# Patient Record
Sex: MALE | Race: WHITE | Employment: OTHER | URBAN - METROPOLITAN AREA
[De-identification: names, ages, dates, MRNs, and addresses within clinical notes are randomized per-mention and may not be internally consistent; named-entity substitution may affect disease eponyms.]

---

## 2017-01-01 PROBLEM — Z86.79 HISTORY OF VENOUS DISEASE: Status: ACTIVE | Noted: 2017-01-01

## 2017-01-01 PROBLEM — Z72.0 TOBACCO ABUSE: Status: ACTIVE | Noted: 2017-01-01

## 2017-01-01 PROBLEM — Z01.818 PREOPERATIVE CLEARANCE: Status: ACTIVE | Noted: 2017-01-01

## 2017-01-01 PROBLEM — Z82.49 FAMILY HISTORY OF PREMATURE CAD: Status: ACTIVE | Noted: 2017-01-01

## 2017-01-01 PROBLEM — I77.9 LEFT-SIDED CAROTID ARTERY DISEASE (HCC): Status: ACTIVE | Noted: 2017-01-01

## 2017-01-01 PROBLEM — I10 ESSENTIAL HYPERTENSION: Status: ACTIVE | Noted: 2017-01-01

## 2017-01-01 PROBLEM — Z95.0 PRESENCE OF PERMANENT CARDIAC PACEMAKER: Status: ACTIVE | Noted: 2017-01-01

## 2017-01-01 PROBLEM — I49.5 SICK SINUS SYNDROME (HCC): Status: ACTIVE | Noted: 2017-01-01

## 2017-01-01 PROBLEM — E78.5 DYSLIPIDEMIA: Status: ACTIVE | Noted: 2017-01-01

## 2017-01-11 ENCOUNTER — CLINICAL SUPPORT (OUTPATIENT)
Dept: CARDIOLOGY CLINIC | Age: 72
End: 2017-01-11

## 2017-01-11 DIAGNOSIS — I49.5 SICK SINUS SYNDROME (HCC): Primary | ICD-10-CM

## 2017-01-11 DIAGNOSIS — Z95.0 PRESENCE OF PERMANENT CARDIAC PACEMAKER: ICD-10-CM

## 2017-01-23 ENCOUNTER — TELEPHONE (OUTPATIENT)
Dept: CARDIOLOGY CLINIC | Age: 72
End: 2017-01-23

## 2017-01-23 NOTE — TELEPHONE ENCOUNTER
Patient's spouse called the office today to update Dr. Damari Rosa on patient's state. Patient had surgery on 1/12/17 with Dr. Tez Apodaca for ear reconstruction and was placed on Keflex when discharged home. Since returning home patient has had hallucinations of someone sitting on his bed and has heard someone singing which was not heard by his wife. Patient spouse called Dr. Tez Apodaca office on 1/22/17 and was advised pt should stop Keflex, which he did. Pt is still hearing music after stopping Keflex, originally it was only around 2 am but is now heard off and on throughout the day. Pt's spouse is waiting for Dr. Everett Lozada office to open to call them to verify if it could still be a reaction to the Keflex.

## 2017-06-30 ENCOUNTER — OFFICE VISIT (OUTPATIENT)
Dept: CARDIOLOGY CLINIC | Age: 72
End: 2017-06-30

## 2017-06-30 VITALS
SYSTOLIC BLOOD PRESSURE: 91 MMHG | DIASTOLIC BLOOD PRESSURE: 59 MMHG | HEIGHT: 69 IN | OXYGEN SATURATION: 97 % | BODY MASS INDEX: 27.7 KG/M2 | HEART RATE: 50 BPM | WEIGHT: 187 LBS

## 2017-06-30 DIAGNOSIS — I48.91 ATRIAL FIBRILLATION, UNSPECIFIED TYPE (HCC): Primary | ICD-10-CM

## 2017-06-30 DIAGNOSIS — Z95.0 PRESENCE OF PERMANENT CARDIAC PACEMAKER: ICD-10-CM

## 2017-06-30 DIAGNOSIS — I10 ESSENTIAL HYPERTENSION: ICD-10-CM

## 2017-06-30 DIAGNOSIS — I48.0 PAROXYSMAL A-FIB (HCC): ICD-10-CM

## 2017-06-30 DIAGNOSIS — E78.5 DYSLIPIDEMIA: ICD-10-CM

## 2017-06-30 LAB
INR BLD: 2.7
PT POC: NORMAL SECONDS
VALID INTERNAL CONTROL?: YES

## 2017-06-30 RX ORDER — CELECOXIB 200 MG/1
CAPSULE ORAL 2 TIMES DAILY
COMMUNITY
End: 2018-09-12 | Stop reason: ALTCHOICE

## 2017-06-30 NOTE — MR AVS SNAPSHOT
Visit Information Date & Time Provider Department Dept. Phone Encounter #  
 6/30/2017  2:00 PM Lieutenant Pagan. Joby Bales Specialist at [facility] [phone] [id number] Follow-up Instructions Return in about 4 weeks (around 7/28/2017). Routing History Your Appointments 7/26/2017  9:45 AM  
PROCEDURE with Freddy Rubalcava Csi Cardio Specialist at [facility]) Appt Note: [address] 6 month check [facility] [address] [place] [address]  
  
    
 8/7/2017  9:15 AM  
Follow Up with Cristine Travis MD  
Cardio Specialist at [facility]) Appt Note: 4 wk f/u  
 [facility] [address] [place] [address] Upcoming Health Maintenance Date Due Hepatitis C Screening 1945 DTaP/Tdap/Td series (1 - Tdap) 3/22/1968 FOBT Q 1 YEAR AGE 50-75 3/22/1997 ZOSTER VACCINE AGE 60> 3/22/2007 GLAUCOMA SCREENING Q2Y 3/22/2012 Pneumococcal 65+ Low/Medium Risk (1 of 2 - PCV13) 3/22/2012 MEDICARE YEARLY EXAM 3/22/2012 INFLUENZA AGE 9 TO ADULT 8/1/2017 Allergies as of 6/30/2017  Review Complete On: 6/30/2017 By: Sabine Woods RN Severity Noted Reaction Type Reactions Latex, Natural Rubber  01/02/2013    Contact Dermatitis Advair Diskus [Fluticasone-salmeterol]  01/02/2013    Nausea and Vomiting Prednisone  01/02/2013    Hives Current Immunizations  Never Reviewed No immunizations on file. Not reviewed this visit You Were Diagnosed With   
  
 Codes Comments At[facility] fibrillation, unspecified type ([facility]    -  Primary ICD-10-CM: I48.91 
ICD-9-CM: 427.31 Vitals BP Pulse Height(growth percentile) Weight(growth percentile) SpO2 BMI 91/59 (!) 50 5' 9\" (1.753 m) 187 lb (84.8 kg) 97% 27.62 kg/m2 Smoking Status Former Smoker BMI and BSA Data Body Mass Index Body Surface Area  
 27.62 kg/m 2 2.03 m 2 Preferred Pharmacy Pharmacy Name Phone [facility] [phone] [place] [address], 130 [facility] [address] [place] [address] [phone] Your Updated Medication List  
  
   
This list is accurate as of: 6/30/17  2:54 PM.  Always use your most recent med list.  
  
  
  
  
 atorvastatin 20 mg tablet Commonly known as:  LIPITOR CeleBREX 200 mg capsule Generic drug:  celecoxib Take  by mouth two (2) times a day. cyanocobalamin 1,000 mcg/mL injection Commonly known as:  VITAMIN B12  
  
 dilTIAZem 360 mg SR capsule Commonly known as:  [facility] FISH OIL 1,000 mg Cap Generic drug:  omega-3 fatty acids-vitamin e Take 1 Cap by mouth. FLOVENT  mcg/actuation inhaler Generic drug:  fluticasone HORIZON NASAL CPAP SYSTEM  
by Does Not Apply route. hydrALAZINE 25 mg tablet Commonly known as:  APRESOLINE  
two (2) times a day. metoprolol succinate 100 mg tablet Commonly known as:  TOPROL-XL  
100 mg two (2) times a day. VENTOLIN HFA 90 mcg/actuation inhaler Generic drug:  albuterol Take  by inhalation. * warfarin 1 mg tablet Commonly known as:  COUMADIN  
  
 * warfarin 5 mg tablet Commonly known as:  COUMADIN  
  
 * Notice: This list has 2 medication(s) that are the same as other medications prescribed for you. Read the directions carefully, and ask your doctor or other care provider to review them with you. We Performed the Following AMB POC PT/INR [86851 CPT(R)] Follow-up Instructions Return in about 4 weeks (around 7/28/2017). Patient Instructions Stop Coumadin Start Eliquis once you get instructions after INR is done Monday Introducing [facility] [place] [facility]! José Luis Ninfajadyn introduces TranSwitch patient portal. Now you can access parts of your medical record, email your doctor's office, and request medication refills online. 1. In your internet browser, go to [email or web address]. [email or web address] 2. Click on the First Time User? Click Here link in the Sign In box. You will see the New Member Sign Up page. 3. Enter your TranSwitch Access Code exactly as it appears below. You will not need to use this code after youve completed the sign-up process. If you do not sign up before the expiration date, you must request a new code. · TranSwitch Access Code: [email or web address] Expires: 9/28/2017  1:27 PM 
 
4. Enter the last four digits of your Social Security Number (xxxx) and Date of Birth (mm/dd/yyyy) as indicated and click Submit. You will be taken to the next sign-up page. 5. Create a TranSwitch ID. This will be your TranSwitch login ID and cannot be changed, so think of one that is secure and easy to remember. 6. Create a TranSwitch password. You can change your password at any time. 7. Enter your Password Reset Question and Answer. This can be used at a later time if you forget your password. 8. Enter your e-mail address. You will receive e-mail notification when new information is available in [address] 9. Click Sign Up. You can now view and download portions of your medical record. 10. Click the Download Summary menu link to download a portable copy of your medical information. If you have questions, please visit the Frequently Asked Questions section of the TranSwitch website. Remember, TranSwitch is NOT to be used for urgent needs. For medical emergencies, dial 911. Now available from your iPhone and Android! Please provide this summary of care documentation to your next provider. Your primary care clinician is listed as Edwin Gonzales. If you have any questions after today's visit, please call [phone].

## 2017-06-30 NOTE — PROGRESS NOTES
1. Have you been to the ER, urgent care clinic since your last visit? Hospitalized since your last visit? No    2. Have you seen or consulted any other health care providers outside of the [facility] since your last visit? Include any pap smears or colon screening.  No

## 2017-06-30 NOTE — PROGRESS NOTES
Subjective:   Mr. Yonatan Bradley with a history of HTN, paroxysmal afib, sick sinus s/p PPM implantation,, and dyslipidemia is here today for office follow up. He is on Warfarin and interested in switching to a novel agent. He also is interested in establishing pacer checks remotely though this clinic. He denies any recent symptoms of chest pain, dyspnea, orthopnea, PND, LE edema, or palpitations. He is still very active doing yard work, but does occasionally have to stop and rest in the heat. Patient's cardiac risk factors are dyslipidemia, male gender, hypertension. Patient Active Problem List    Diagnosis Date Noted    Paroxysmal a-fib [facility]) 06/30/2017    Preoperative clearance 01/01/2017    Left-sided carotid artery disease ([facility] 01/01/2017    Sick sinus syndrome ([facility] 01/01/2017    Presence of permanent cardiac pacemaker 01/01/2017    History of venous disease 01/01/2017    Essential hypertension 01/01/2017    Dyslipidemia 01/01/2017    Tobacco abuse 01/01/2017    Family history of premature CAD 01/01/2017     Current Outpatient Prescriptions   Medication Sig Dispense Refill    celecoxib (CELEBREX) 200 mg capsule Take  by mouth two (2) times a day.  cyanocobalamin (VITAMIN B12) 1,000 mcg/mL injection       omega-3 fatty acids-vitamin e (FISH OIL) 1,000 mg cap Take 1 Cap by mouth.  albuterol (VENTOLIN HFA) 90 mcg/actuation inhaler Take  by inhalation.  OXYGEN-AIR DELIVERY SYSTEMS (HORIZON NASAL CPAP SYSTEM) by Does Not Apply route.  atorvastatin (LIPITOR) 20 mg tablet       diltiazem (TIAZAC) 360 mg SR capsule       FLOVENT  mcg/actuation inhaler       metoprolol succinate (TOPROL-XL) 100 mg tablet 100 mg two (2) times a day.  warfarin (COUMADIN) 1 mg tablet       warfarin (COUMADIN) 5 mg tablet       hydrALAZINE (APRESOLINE) 25 mg tablet two (2) times a day.        Allergies   Allergen Reactions    Latex, Natural Rubber Contact Dermatitis    Advair Diskus [Fluticasone-Salmeterol] Nausea and Vomiting    Prednisone Hives     No past medical history on file. Past Surgical History:   Procedure Laterality Date    HX PACEMAKER       No family history on file. History   Smoking Status    Former Smoker   Smokeless Tobacco    Not on file          Review of Systems, additional:  Constitutional: negative  Eyes: negative  Respiratory: negative for dyspnea on exertion  Cardiovascular: per HPI  Gastrointestinal: negative for nausea and vomiting  Musculoskeletal:negative  Neurological: negative  Behvioral/Psych: negative  Endocrine: negative  ENT: negative    Objective:     Visit Vitals    BP 91/59    Pulse (!) 50    Ht 5' 9\" (1.753 m)    Wt 187 lb (84.8 kg)    SpO2 97%    BMI 27.62 kg/m2     General:  alert, cooperative, no distress   Chest Wall: inspection normal - no chest wall deformities or tenderness, respiratory effort normal   Lung: clear to auscultation bilaterally   Heart:  normal rate, regular rhythm, normal S1, S2, no murmurs, rubs, clicks or gallops   Abdomen: soft, non-tender. Bowel sounds normal. No masses,  no organomegaly   Extremities: extremities normal, atraumatic, no cyanosis or edema Skin: no rashes   Neuro: alert, oriented, normal speech, no focal findings or movement disorder noted         Assessment/Plan:         ICD-10-CM ICD-9-CM    1. Atrial fibrillation, unspecified type (HCC) I48.91 427.31 AMB POC PT/INR      PROTHROMBIN TIME + INR   2. Presence of permanent cardiac pacemaker Z95.0 V45.01    3. Dyslipidemia E78.5 272.4    4. Essential hypertension I10 401.9      - Afib: He would like to switch from Warfarin to Eliquis. His INR in the office today is 2.7. He is to hold Coumadin through the weekend, return Monday for an INR check and start Eliquis if it is <2.0.   - PPM in place: he will return to clinic when [facility] rep is in office July 12th, to establish routine wireless checks.   - Dyslipidemia: continue statin  - HTN: continue Diltiazem and Hydralazine    - He understands to contact office as needed as symptoms dictate.

## 2017-07-03 ENCOUNTER — HOSPITAL ENCOUNTER (OUTPATIENT)
Dept: LAB | Age: 72
Discharge: HOME | End: 2017-07-03
Payer: MEDICARE

## 2017-07-03 DIAGNOSIS — I48.91 ATRIAL FIBRILLATION, UNSPECIFIED TYPE (HCC): ICD-10-CM

## 2017-07-03 LAB
INR PPP: 1.2 (ref 0.8–1.2)
PROTHROMBIN TIME: 14.8 SEC (ref 11.5–15.2)

## 2017-07-03 PROCEDURE — 36415 COLL VENOUS BLD VENIPUNCTURE: CPT | Performed by: PHYSICIAN ASSISTANT

## 2017-07-03 PROCEDURE — 85610 PROTHROMBIN TIME: CPT | Performed by: PHYSICIAN ASSISTANT

## 2017-07-03 NOTE — TELEPHONE ENCOUNTER
Per Mikhail Collins, pts INR was 1.2 today from hospital lab. Pt advised last dose of Coumadin was Thursday night 06/29/17. Okay to start Eliquis. Verbal order and read back per Kaylah Burgess.  Jennifer Shetty PA-C

## 2017-07-26 ENCOUNTER — CLINICAL SUPPORT (OUTPATIENT)
Dept: CARDIOLOGY CLINIC | Age: 72
End: 2017-07-26

## 2017-07-26 DIAGNOSIS — Z95.0 PACEMAKER: ICD-10-CM

## 2017-07-26 DIAGNOSIS — I48.0 PAROXYSMAL A-FIB (HCC): Primary | ICD-10-CM

## 2017-08-07 ENCOUNTER — OFFICE VISIT (OUTPATIENT)
Dept: CARDIOLOGY CLINIC | Age: 72
End: 2017-08-07

## 2017-08-07 VITALS
DIASTOLIC BLOOD PRESSURE: 83 MMHG | HEIGHT: 69 IN | HEART RATE: 76 BPM | WEIGHT: 190 LBS | BODY MASS INDEX: 28.14 KG/M2 | SYSTOLIC BLOOD PRESSURE: 143 MMHG | OXYGEN SATURATION: 96 %

## 2017-08-07 DIAGNOSIS — I48.0 PAROXYSMAL A-FIB (HCC): ICD-10-CM

## 2017-08-07 DIAGNOSIS — I49.5 SICK SINUS SYNDROME (HCC): ICD-10-CM

## 2017-08-07 DIAGNOSIS — Z95.0 PRESENCE OF PERMANENT CARDIAC PACEMAKER: ICD-10-CM

## 2017-08-07 DIAGNOSIS — Z82.49 FAMILY HISTORY OF PREMATURE CAD: ICD-10-CM

## 2017-08-07 DIAGNOSIS — Z72.0 TOBACCO ABUSE: ICD-10-CM

## 2017-08-07 DIAGNOSIS — I77.9 LEFT-SIDED CAROTID ARTERY DISEASE (HCC): ICD-10-CM

## 2017-08-07 DIAGNOSIS — Z86.79 HISTORY OF VENOUS DISEASE: ICD-10-CM

## 2017-08-07 DIAGNOSIS — E78.5 DYSLIPIDEMIA: ICD-10-CM

## 2017-08-07 DIAGNOSIS — I10 ESSENTIAL HYPERTENSION: Primary | ICD-10-CM

## 2017-08-07 NOTE — MR AVS SNAPSHOT
Visit Information Date & Time Provider Department Dept. Phone Encounter #  
 8/7/2017  9:15 AM Marcelino Looney  [facility] [phone] [id number] Follow-up Instructions Return in about 6 months (around 2/7/2018). Upcoming Health Maintenance Date Due Hepatitis C Screening 1945 DTaP/Tdap/Td series (1 - Tdap) 3/22/1968 FOBT Q 1 YEAR AGE 50-75 3/22/1997 ZOSTER VACCINE AGE 60> 1/22/2007 GLAUCOMA SCREENING Q2Y 3/22/2012 Pneumococcal 65+ Low/Medium Risk (1 of 2 - PCV13) 3/22/2012 MEDICARE YEARLY EXAM 3/22/2012 INFLUENZA AGE 9 TO ADULT 8/1/2017 Allergies as of 8/7/2017  Review Complete On: 6/30/2017 By: Tray Chang. Ranjeet Honeycutt PA-C Severity Noted Reaction Type Reactions Latex, Natural Rubber  01/02/2013    Contact Dermatitis Advair Diskus [Fluticasone-salmeterol]  01/02/2013    Nausea and Vomiting Prednisone  01/02/2013    Hives Current Immunizations  Never Reviewed No immunizations on file. Not reviewed this visit Vitals BP Pulse Height(growth percentile) Weight(growth percentile) SpO2 BMI  
 143/83 76 5' 9\" (1.753 m) 190 lb (86.2 kg) 96% 28.06 kg/m2 Smoking Status Former Smoker BMI and BSA Data Body Mass Index Body Surface Area 28.06 kg/m 2 2.05 m 2 Preferred Pharmacy Pharmacy Name Phone [facility] [phone], [phone] [place] [address] [phone] Your Updated Medication List  
  
   
This list is accurate as of: 8/7/17  9:30 AM.  Always use your most recent med list.  
  
  
  
  
 apixaban 5 mg tablet Commonly known as:  Cherylin Guardian Take 1 Tab by mouth two (2) times a day. atorvastatin 20 mg tablet Commonly known as:  LIPITOR CeleBREX 200 mg capsule Generic drug:  celecoxib Take  by mouth two (2) times a day. cyanocobalamin 1,000 mcg/mL injection Commonly known as:  VITAMIN B12  
  
 dilTIAZem 360 mg SR capsule Commonly known as:  [facility] FISH OIL 1,000 mg Cap Generic drug:  omega-3 fatty acids-vitamin e Take 1 Cap by mouth. FLOVENT  mcg/actuation inhaler Generic drug:  fluticasone HORIZON NASAL CPAP SYSTEM  
by Does Not Apply route. hydrALAZINE 25 mg tablet Commonly known as:  APRESOLINE  
two (2) times a day. metoprolol succinate 100 mg tablet Commonly known as:  TOPROL-XL  
100 mg two (2) times a day. VENTOLIN HFA 90 mcg/actuation inhaler Generic drug:  albuterol Take  by inhalation. * warfarin 1 mg tablet Commonly known as:  COUMADIN  
  
 * warfarin 5 mg tablet Commonly known as:  COUMADIN  
  
 * Notice: This list has 2 medication(s) that are the same as other medications prescribed for you. Read the directions carefully, and ask your doctor or other care provider to review them with you. Follow-up Instructions Return in about 6 months (around 2/7/2018). Introducing [facility]! Alessia Maddox introduces Flavourly patient portal. Now you can access parts of your medical record, email your doctor's office, and request medication refills online. 1. In your internet browser, go to [email or web address] 2. Click on the First Time User? Click Here link in the Sign In box. You will see the New Member Sign Up page. 3. Enter your Flavourly Access Code exactly as it appears below. You will not need to use this code after youve completed the sign-up process. If you do not sign up before the expiration date, you must request a new code. · Flavourly Access Code: [email or web address] Expires: 9/28/2017  1:27 PM 
 
4. Enter the last four digits of your Social Security Number (xxxx) and Date of Birth (mm/dd/yyyy) as indicated and click Submit. You will be taken to the next sign-up page. 5. Create a iTaggit ID. This will be your iTaggit login ID and cannot be changed, so think of one that is secure and easy to remember. 6. Create a iTaggit password. You can change your password at any time. 7. Enter your Password Reset Question and Answer. This can be used at a later time if you forget your password. 8. Enter your e-mail address. You will receive e-mail notification when new information is available in [address] 9. Click Sign Up. You can now view and download portions of your medical record. 10. Click the Download Summary menu link to download a portable copy of your medical information. If you have questions, please visit the Frequently Asked Questions section of the iTaggit website. Remember, iTaggit is NOT to be used for urgent needs. For medical emergencies, dial 911. Now available from your iPhone and Android! Please provide this summary of care documentation to your next provider. Your primary care clinician is listed as Ritesh Carey. If you have any questions after today's visit, please call [phone].

## 2017-08-07 NOTE — LETTER
Patient:  Emelia Byers YOB: 1945 Date of Visit: 8/7/2017 Dear Emiliana Dave MD 
[address] [facility] [address] [address] VIA Facsimile: [phone] 
 : 
 
 
Thank you for referring Mr. Dilia Morris to me for evaluation/treatment. Below are the relevant portions of my assessment and plan of care. Subjective:  
   Dilia Morris is in the office today for cardiac reevaluation. He has a history of hypertension, chronic atrial fibrillation, sick sinus syndrome with prior permanent pacemaker implantation and dyslipidemia. On his last visit of 06/30, he requested to be switched off of Coumadin to one of the novel anticoagulants. He is on Eliquis and tolerating it. He has had no recent symptoms of chest pain, dyspnea, orthopnea, PND, lower extremity edema or palpitations. He continues to be physically active and walks his dog about six blocks every day. He has had no chest pain or limiting shortness of breath. He is now interested in having his pacemaker followed here, as [facility] has [facility] in the past.       
 
 
  
 
 
 
Patient Active Problem List  
 Diagnosis Date Noted  Paroxysmal a-fib ([facility] 06/30/2017  Preoperative clearance 01/01/2017  Left-sided carotid artery disease ([facility] 01/01/2017  Sick sinus syndrome ([facility] 01/01/2017  Presence of permanent cardiac pacemaker 01/01/2017  History of venous disease 01/01/2017  Essential hypertension 01/01/2017  Dyslipidemia 01/01/2017  Tobacco abuse 01/01/2017  Family history of premature CAD 01/01/2017 Current Outpatient Prescriptions Medication Sig Dispense Refill  apixaban (ELIQUIS) 5 mg tablet Take 1 Tab by mouth two (2) times a day. 180 Tab 3  celecoxib (CELEBREX) 200 mg capsule Take  by mouth two (2) times a day.  hydrALAZINE (APRESOLINE) 25 mg tablet two (2) times a day.  cyanocobalamin (VITAMIN B12) 1,000 mcg/mL injection  omega-3 fatty acids-vitamin e (FISH OIL) 1,000 mg cap Take 1 Cap by mouth.  albuterol (VENTOLIN HFA) 90 mcg/actuation inhaler Take  by inhalation.  OXYGEN-AIR DELIVERY SYSTEMS (HORIZON NASAL CPAP SYSTEM) by Does Not Apply route.  atorvastatin (LIPITOR) 20 mg tablet  diltiazem (TIAZAC) 360 mg SR capsule  FLOVENT  mcg/actuation inhaler  metoprolol succinate (TOPROL-XL) 100 mg tablet 100 mg two (2) times a day.  warfarin (COUMADIN) 1 mg tablet  warfarin (COUMADIN) 5 mg tablet Allergies Allergen Reactions  Latex, Natural Rubber Contact Dermatitis  Advair Diskus [Fluticasone-Salmeterol] Nausea and Vomiting  Prednisone Hives No past medical history on file. Past Surgical History:  
Procedure Laterality Date  HX PACEMAKER No family history on file. History Smoking Status  Former Smoker Smokeless Tobacco  
 Not on file Review of Systems, additional: 
Constitutional: negative Eyes: negative Respiratory: negative Cardiovascular: negative Gastrointestinal: negative Musculoskeletal:negative Neurological: negative Behvioral/Psych: negative Endocrine: negative ENT: negative Objective:  
 
Visit Vitals  /83  Pulse 76  Ht 5' 9\" (1.753 m)  Wt 190 lb (86.2 kg)  SpO2 96%  BMI 28.06 kg/m2 General:  alert, cooperative, no distress Chest Wall: inspection normal - no chest wall deformities or tenderness, respiratory effort normal  
Lung: clear to auscultation bilaterally Heart:  normal rate and regular rhythm, no gallops noted Abdomen: soft, non-tender. Bowel sounds normal. No masses,  no organomegaly Extremities: extremities normal, atraumatic, no cyanosis or edema Skin: no rashes Neuro: alert, oriented, normal speech, no focal findings or movement disorder noted Assessment/Plan: ICD-10-CM ICD-9-CM 1. Essential hypertension, controlled in office today I10 401.9 2. Left-sided carotid artery disease (HCC) I77.9 447.9 3. Chronic a-fib ([facility] on Eliquis for anticoagulation, diltiazem and metoprolol for rate control. RT 6 mos with pacer interrogation I48.0 427.31   
4. Sick sinus syndrome ([facility] S/P PPM I49.5 427.81   
5. Dyslipidemia E78.5 272.4 6. Family history of premature CAD Z82.49 V17.3 7. History of venous disease, S/P vein stripping Z86.79 V12.59   
8. Presence of permanent cardiac pacemaker, initial implant 2007, generator replacement 2014 all done at [facility].  Z95.0 V45.01   
9. Tobacco abuse Z72.0 305.1 If you have questions, please do not hesitate to call me. I look forward to following Mr. Perez Castañeda along with you. Sincerely, Tao Garrido MD

## 2017-08-13 NOTE — PROGRESS NOTES
Subjective:      Hayde Gandara is in the office today for cardiac reevaluation. He has a history of hypertension, chronic atrial fibrillation, sick sinus syndrome with prior permanent pacemaker implantation and dyslipidemia. On his last visit of 06/30, he requested to be switched off of Coumadin to one of the novel anticoagulants. He is on Eliquis and tolerating it. He has had no recent symptoms of chest pain, dyspnea, orthopnea, PND, lower extremity edema or palpitations. He continues to be physically active and walks his dog about six blocks every day. He has had no chest pain or limiting shortness of breath. He is now interested in having his pacemaker followed here, as he has been followed at [facility] in the past.                     Patient Active Problem List    Diagnosis Date Noted    Paroxysmal a-fib [facility]) 06/30/2017    Preoperative clearance 01/01/2017    Left-sided carotid artery disease ([facility] 01/01/2017    Sick sinus syndrome ([facility] 01/01/2017    Presence of permanent cardiac pacemaker 01/01/2017    History of venous disease 01/01/2017    Essential hypertension 01/01/2017    Dyslipidemia 01/01/2017    Tobacco abuse 01/01/2017    Family history of premature CAD 01/01/2017     Current Outpatient Prescriptions   Medication Sig Dispense Refill    apixaban (ELIQUIS) 5 mg tablet Take 1 Tab by mouth two (2) times a day. 180 Tab 3    celecoxib (CELEBREX) 200 mg capsule Take  by mouth two (2) times a day.  hydrALAZINE (APRESOLINE) 25 mg tablet two (2) times a day.  cyanocobalamin (VITAMIN B12) 1,000 mcg/mL injection       omega-3 fatty acids-vitamin e (FISH OIL) 1,000 mg cap Take 1 Cap by mouth.  albuterol (VENTOLIN HFA) 90 mcg/actuation inhaler Take  by inhalation.  OXYGEN-AIR DELIVERY SYSTEMS (HORIZON NASAL CPAP SYSTEM) by Does Not Apply route.       atorvastatin (LIPITOR) 20 mg tablet       diltiazem (TIAZAC) 360 mg SR capsule       FLOVENT  mcg/actuation inhaler       metoprolol succinate (TOPROL-XL) 100 mg tablet 100 mg two (2) times a day.  warfarin (COUMADIN) 1 mg tablet       warfarin (COUMADIN) 5 mg tablet        Allergies   Allergen Reactions    Latex, Natural Rubber Contact Dermatitis    Advair Diskus [Fluticasone-Salmeterol] Nausea and Vomiting    Prednisone Hives     No past medical history on file. Past Surgical History:   Procedure Laterality Date    HX PACEMAKER       No family history on file. History   Smoking Status    Former Smoker   Smokeless Tobacco    Not on file          Review of Systems, additional:  Constitutional: negative  Eyes: negative  Respiratory: negative  Cardiovascular: negative  Gastrointestinal: negative  Musculoskeletal:negative  Neurological: negative  Behvioral/Psych: negative  Endocrine: negative  ENT: negative    Objective:     Visit Vitals    /83    Pulse 76    Ht 5' 9\" (1.753 m)    Wt 190 lb (86.2 kg)    SpO2 96%    BMI 28.06 kg/m2     General:  alert, cooperative, no distress   Chest Wall: inspection normal - no chest wall deformities or tenderness, respiratory effort normal   Lung: clear to auscultation bilaterally   Heart:  normal rate and regular rhythm, no gallops noted   Abdomen: soft, non-tender. Bowel sounds normal. No masses,  no organomegaly   Extremities: extremities normal, atraumatic, no cyanosis or edema Skin: no rashes   Neuro: alert, oriented, normal speech, no focal findings or movement disorder noted         Assessment/Plan:       ICD-10-CM ICD-9-CM    1. Essential hypertension, controlled in office today I10 401.9    2. Left-sided carotid artery disease (HCC) I77.9 447.9    3. Chronic a-fib ([facility] [address] [facility] on Eliquis for anticoagulation, diltiazem and metoprolol for rate control. RT 6 mos with pacer interrogation I48.0 427.31    4. Sick sinus syndrome ([facility] S/P PPM I49.5 427.81    5. Dyslipidemia E78.5 272.4    6.  Family history of premature CAD Z82.49 V17.3 7. History of venous disease, S/P vein stripping Z86.79 V12.59    8. Presence of permanent cardiac pacemaker, initial implant 2007, generator replacement 2014 all done at [facility].  Z95.0 V45.01    9.  Tobacco abuse Z72.0 305.1

## 2017-09-27 ENCOUNTER — TELEPHONE (OUTPATIENT)
Dept: CARDIOLOGY CLINIC | Age: 72
End: 2017-09-27

## 2017-09-27 NOTE — TELEPHONE ENCOUNTER
Pt's wife called office to advise that pt is having a toenail removed on 10/9/17 and wants to know if he can hold Eliquis for 1 day prior to procedure. Per Dr. Jovani Olivas, pt can hold Eliquis 1 day prior to procedure and continue as prescribed after procedure is completed. Pt's wife made aware of message.      Verbal order and read back per Evon Bamberger, MD

## 2018-01-02 NOTE — COMMUNICATION BODY
Subjective:      Nichole Crocker is in the office today for cardiac reevaluation. He has a history of hypertension, chronic atrial fibrillation, sick sinus syndrome with prior permanent pacemaker implantation and dyslipidemia. On his last visit of 06/30, he requested to be switched off of Coumadin to one of the novel anticoagulants. He is on Eliquis and tolerating it. He has had no recent symptoms of chest pain, dyspnea, orthopnea, PND, lower extremity edema or palpitations. He continues to be physically active and walks his dog about six blocks every day. He has had no chest pain or limiting shortness of breath. He is now interested in having his pacemaker followed here, as he has been [facility] in the past.                     Patient Active Problem List    Diagnosis Date Noted    Paroxysmal a-fib [facility]) 06/30/2017    Preoperative clearance 01/01/2017    Left-sided carotid artery disease ([facility] [address] 01/01/2017    Sick sinus syndrome ([facility] [address] 01/01/2017    Presence of permanent cardiac pacemaker 01/01/2017    History of venous disease 01/01/2017    Essential hypertension 01/01/2017    Dyslipidemia 01/01/2017    Tobacco abuse 01/01/2017    Family history of premature CAD 01/01/2017     Current Outpatient Prescriptions   Medication Sig Dispense Refill    apixaban (ELIQUIS) 5 mg tablet Take 1 Tab by mouth two (2) times a day. 180 Tab 3    celecoxib (CELEBREX) 200 mg capsule Take  by mouth two (2) times a day.  hydrALAZINE (APRESOLINE) 25 mg tablet two (2) times a day.  cyanocobalamin (VITAMIN B12) 1,000 mcg/mL injection       omega-3 fatty acids-vitamin e (FISH OIL) 1,000 mg cap Take 1 Cap by mouth.  albuterol (VENTOLIN HFA) 90 mcg/actuation inhaler Take  by inhalation.  OXYGEN-AIR DELIVERY SYSTEMS (HORIZON NASAL CPAP SYSTEM) by Does Not Apply route.       atorvastatin (LIPITOR) 20 mg tablet       diltiazem (TIAZAC) 360 mg SR capsule       FLOVENT  mcg/actuation inhaler       metoprolol succinate (TOPROL-XL) 100 mg tablet 100 mg two (2) times a day.  warfarin (COUMADIN) 1 mg tablet       warfarin (COUMADIN) 5 mg tablet        Allergies   Allergen Reactions    Latex, Natural Rubber Contact Dermatitis    Advair Diskus [Fluticasone-Salmeterol] Nausea and Vomiting    Prednisone Hives     No past medical history on file. Past Surgical History:   Procedure Laterality Date    HX PACEMAKER       No family history on file. History   Smoking Status    Former Smoker   Smokeless Tobacco    Not on file          Review of Systems, additional:  Constitutional: negative  Eyes: negative  Respiratory: negative  Cardiovascular: negative  Gastrointestinal: negative  Musculoskeletal:negative  Neurological: negative  Behvioral/Psych: negative  Endocrine: negative  ENT: negative    Objective:     Visit Vitals    /83    Pulse 76    Ht 5' 9\" (1.753 m)    Wt 190 lb (86.2 kg)    SpO2 96%    BMI 28.06 kg/m2     General:  alert, cooperative, no distress   Chest Wall: inspection normal - no chest wall deformities or tenderness, respiratory effort normal   Lung: clear to auscultation bilaterally   Heart:  normal rate and regular rhythm, no gallops noted   Abdomen: soft, non-tender. Bowel sounds normal. No masses,  no organomegaly   Extremities: extremities normal, atraumatic, no cyanosis or edema Skin: no rashes   Neuro: alert, oriented, normal speech, no focal findings or movement disorder noted         Assessment/Plan:       ICD-10-CM ICD-9-CM    1. Essential hypertension, controlled in office today I10 401.9    2. Left-sided carotid artery disease (HCC) I77.9 447.9    3. Chronic a-fib ([facility] [address] [address] on Eliquis for anticoagulation, diltiazem and metoprolol for rate control. RT 6 mos with pacer interrogation I48.0 427.31    4. Sick sinus syndrome ([facility] [address] [address] S/P PPM I49.5 427.81    5. Dyslipidemia E78.5 272.4    6.  Family history of premature CAD Z82.49 V17.3 7. History of venous disease, S/P vein stripping Z86.79 V12.59    8. Presence of permanent cardiac pacemaker, initial implant 2007, generator replacement 2014 all done at [facility].  Z95.0 V45.01    9.  Tobacco abuse Z72.0 305.1

## 2018-01-24 ENCOUNTER — CLINICAL SUPPORT (OUTPATIENT)
Dept: CARDIOLOGY CLINIC | Age: 73
End: 2018-01-24

## 2018-01-24 ENCOUNTER — OFFICE VISIT (OUTPATIENT)
Dept: CARDIOLOGY CLINIC | Age: 73
End: 2018-01-24

## 2018-01-24 VITALS
DIASTOLIC BLOOD PRESSURE: 78 MMHG | HEART RATE: 84 BPM | WEIGHT: 191 LBS | SYSTOLIC BLOOD PRESSURE: 135 MMHG | HEIGHT: 69 IN | OXYGEN SATURATION: 98 % | BODY MASS INDEX: 28.29 KG/M2

## 2018-01-24 DIAGNOSIS — I48.0 PAROXYSMAL A-FIB (HCC): ICD-10-CM

## 2018-01-24 DIAGNOSIS — E78.5 DYSLIPIDEMIA: ICD-10-CM

## 2018-01-24 DIAGNOSIS — I77.9 LEFT-SIDED CAROTID ARTERY DISEASE (HCC): ICD-10-CM

## 2018-01-24 DIAGNOSIS — Z72.0 TOBACCO ABUSE: ICD-10-CM

## 2018-01-24 DIAGNOSIS — I49.5 SICK SINUS SYNDROME (HCC): Primary | ICD-10-CM

## 2018-01-24 DIAGNOSIS — I10 ESSENTIAL HYPERTENSION: ICD-10-CM

## 2018-01-24 DIAGNOSIS — Z82.49 FAMILY HISTORY OF PREMATURE CAD: ICD-10-CM

## 2018-01-24 DIAGNOSIS — Z95.0 PRESENCE OF PERMANENT CARDIAC PACEMAKER: ICD-10-CM

## 2018-01-24 NOTE — PROGRESS NOTES
1. Have you been to the ER, urgent care clinic since your last visit? Hospitalized since your last visit? No    2. Have you seen or consulted any other health care providers outside of the [address] [place] [address] since your last visit? Include any pap smears or colon screening.  No

## 2018-01-24 NOTE — LETTER
Patient:  Lorena Prasad YOB: 1945 Date of Visit: 1/24/2018 Dear Kareem Edwards MD 
[address] [facility] [address] [address] VIA Facsimile: [phone] 
 : 
 
 
Thank you for referring Mr. Westley Alonso to me for evaluation/treatment. Below are the relevant portions of my assessment and plan of care. Subjective:  
   Westley Alonso is in the office today for cardiac reevaluation. He has a history of hypertension, chronic atrial fibrillation, sick sinus syndrome with prior permanent pacemaker implantation and dyslipidemia. In regard to his atrial fibrillation, he is on Eliquis for stroke prevention and a combination of a BB and a CCB. He has had no recent symptoms of chest pain, dyspnea, orthopnea, PND, lower extremity edema or palpitations. He continues to be physically active and walks his dog about six blocks every day. He has had no chest pain or limiting shortness of breath. He had his pacemaker checked in the office today. Patient Active Problem List  
 Diagnosis Date Noted  Paroxysmal a-fib ([facility] [address] 06/30/2017  Preoperative clearance 01/01/2017  Left-sided carotid artery disease ([facility] [address] 01/01/2017  Sick sinus syndrome ([facility] [address] 01/01/2017  Presence of permanent cardiac pacemaker 01/01/2017  History of venous disease 01/01/2017  Essential hypertension 01/01/2017  Dyslipidemia 01/01/2017  Tobacco abuse 01/01/2017  Family history of premature CAD 01/01/2017 Current Outpatient Prescriptions Medication Sig Dispense Refill  apixaban (ELIQUIS) 5 mg tablet Take 1 Tab by mouth two (2) times a day. 180 Tab 3  celecoxib (CELEBREX) 200 mg capsule Take  by mouth two (2) times a day.  hydrALAZINE (APRESOLINE) 25 mg tablet two (2) times a day.  cyanocobalamin (VITAMIN B12) 1,000 mcg/mL injection  omega-3 fatty acids-vitamin e (FISH OIL) 1,000 mg cap Take 1 Cap by mouth.  albuterol (VENTOLIN HFA) 90 mcg/actuation inhaler Take  by inhalation.  OXYGEN-AIR DELIVERY SYSTEMS (HORIZON NASAL CPAP SYSTEM) by Does Not Apply route.  atorvastatin (LIPITOR) 20 mg tablet  diltiazem (TIAZAC) 360 mg SR capsule  FLOVENT  mcg/actuation inhaler  metoprolol succinate (TOPROL-XL) 100 mg tablet 100 mg two (2) times a day.  warfarin (COUMADIN) 1 mg tablet  warfarin (COUMADIN) 5 mg tablet Allergies Allergen Reactions  Latex, Natural Rubber Contact Dermatitis  Advair Diskus [Fluticasone-Salmeterol] Nausea and Vomiting  Prednisone Hives No past medical history on file. Past Surgical History:  
Procedure Laterality Date  HX PACEMAKER No family history on file. History Smoking Status  Former Smoker Smokeless Tobacco  
 Never Used Review of Systems, additional: 
Constitutional: negative Eyes: negative Respiratory: negative Cardiovascular: negative Gastrointestinal: negative Musculoskeletal:negative Neurological: negative Behvioral/Psych: negative Endocrine: negative ENT: negative Objective:  
 
Visit Vitals  /78  Pulse 84  
 Ht 5' 9\" (1.753 m)  Wt 191 lb (86.6 kg) Comment: pt reported  SpO2 98%  BMI 28.21 kg/m2 General:  alert, cooperative, no distress Chest Wall: inspection normal - no chest wall deformities or tenderness, respiratory effort normal  
Lung: clear to auscultation bilaterally Heart:  normal rate and regular rhythm, no gallops noted Abdomen: soft, non-tender. Bowel sounds normal. No masses,  no organomegaly Extremities: extremities normal, atraumatic, no cyanosis or edema Skin: no rashes Neuro: alert, oriented, normal speech, no focal findings or movement disorder noted Assessment/Plan: ICD-10-CM ICD-9-CM 1. Essential hypertension, controlled in office today I10 401.9 2. Left-sided carotid artery disease ([facility] [address] followed by Dr Nora Huff I77.9 447.9 3. Chronic a-fib ([facility] [address] on Eliquis for anticoagulation, diltiazem and metoprolol for rate control. RT 6 mos  I48.0 427.31   
4. Sick sinus syndrome ([facility] [address] S/P PPM. Pacer checked today in office and functioning well. I49.5 427.81   
5. Dyslipidemia E78.5 272.4 6. Family history of premature CAD Z82.49 V17.3 7. History of venous disease, S/P vein stripping Z86.79 V12.59   
8. Presence of permanent cardiac pacemaker, initial implant 2007, generator replacement 2014 all done at [facility].  Z95.0 V45.01   
9. Tobacco abuse Z72.0 305.1 If you have questions, please do not hesitate to call me. I look forward to following Mr. Mahogany Adams along with you. Sincerely, Justin Alicea MD

## 2018-01-24 NOTE — MR AVS SNAPSHOT
[address] [facility] 
 
 
 [address] [facility] [address] [address] 
[phone] Patient: Dimas Molina MRN: [id number] Visit Information Date & Time Provider Department Dept. Phone Encounter #  
 1/24/2018  9:00 AM Jennifer Flores MD [address] [place] [address] Specialist at [place] [phone] Follow-up Instructions Return in about 6 months (around 7/24/2018). Your Appointments 7/25/2018 10:00 AM  
Follow Up with Jennifer Flores MD  
Cardio Specialist at [address]) Appt Note: 6 months - also need BS check [id number] [facility] [address] [address] [address]  
  
   
 [id number] [place] [address] Upcoming Health Maintenance Date Due Hepatitis C Screening 1945 DTaP/Tdap/Td series (1 - Tdap) 3/22/1968 FOBT Q 1 YEAR AGE 50-75 3/22/1997 ZOSTER VACCINE AGE 60> 1/22/2007 GLAUCOMA SCREENING Q2Y 3/22/2012 Pneumococcal 65+ Low/Medium Risk (1 of 2 - PCV13) 3/22/2012 MEDICARE YEARLY EXAM 3/22/2012 Influenza Age 5 to Adult 8/1/2017 Allergies as of 1/24/2018  Review Complete On: 1/24/2018 By: Jg Mejia RN Severity Noted Reaction Type Reactions Latex, Natural Rubber  01/02/2013    Contact Dermatitis Advair Diskus [Fluticasone-salmeterol]  01/02/2013    Nausea and Vomiting Prednisone  01/02/2013    Hives Current Immunizations  Never Reviewed No immunizations on file. Not reviewed this visit Vitals BP Pulse Height(growth percentile) Weight(growth percentile) SpO2 BMI  
 135/78 84 5' 9\" (1.753 m) 191 lb (86.6 kg) 98% 28.21 kg/m2 Smoking Status Former Smoker Vitals History BMI and BSA Data Body Mass Index Body Surface Area  
 28.21 kg/m 2 2.05 m 2 Preferred Pharmacy Pharmacy Name Phone [phone] [facility] [address] [facility] [phone] [place] [address] [phone] Your Updated Medication List  
  
   
This list is accurate as of: 1/24/18  9:55 AM.  Always use your most recent med list.  
  
  
  
  
 apixaban 5 mg tablet Commonly known as:  Markus Bury Take 1 Tab by mouth two (2) times a day. atorvastatin 20 mg tablet Commonly known as:  LIPITOR CeleBREX 200 mg capsule Generic drug:  celecoxib Take  by mouth two (2) times a day. cyanocobalamin 1,000 mcg/mL injection Commonly known as:  VITAMIN B12  
  
 dilTIAZem 360 mg SR capsule Commonly known as:  [facility] FISH OIL 1,000 mg Cap Generic drug:  omega-3 fatty acids-vitamin e Take 1 Cap by mouth. FLOVENT  mcg/actuation inhaler Generic drug:  fluticasone HORIZON NASAL CPAP SYSTEM  
by Does Not Apply route. hydrALAZINE 25 mg tablet Commonly known as:  APRESOLINE  
two (2) times a day. metoprolol succinate 100 mg tablet Commonly known as:  TOPROL-XL  
100 mg two (2) times a day. VENTOLIN HFA 90 mcg/actuation inhaler Generic drug:  albuterol Take  by inhalation. * warfarin 1 mg tablet Commonly known as:  COUMADIN  
  
 * warfarin 5 mg tablet Commonly known as:  COUMADIN  
  
 * Notice: This list has 2 medication(s) that are the same as other medications prescribed for you. Read the directions carefully, and ask your doctor or other care provider to review them with you. Follow-up Instructions Return in about 6 months (around 7/24/2018). Introducing [facility]! [facility] introduces [employer] patient portal. Now you can access parts of your medical record, email your doctor's office, and request medication refills online. 1. In your internet browser, go to [email or web address]://[employer]. [employer] 2. Click on the First Time User? Click Here link in the Sign In box. You will see the New Member Sign Up page. 3. Enter your [employer] Access Code exactly as it appears below. You will not need to use this code after youve completed the sign-up process. If you do not sign up before the expiration date, you must request a new code. · [employer] Access Code: [email or web address] Expires: 4/24/2018  8:27 AM 
 
4. Enter the last four digits of your Social Security Number (xxxx) and Date of Birth (mm/dd/yyyy) as indicated and click Submit. You will be taken to the next sign-up page. 5. Create a [employer] ID. This will be your [employer] login ID and cannot be changed, so think of one that is secure and easy to remember. 6. Create a [employer] password. You can change your password at any time. 7. Enter your Password Reset Question and Answer. This can be used at a later time if you forget your password. 8. Enter your e-mail address. You will receive e-mail notification when new information is available in [address] 9. Click Sign Up. You can now view and download portions of your medical record. 10. Click the Download Summary menu link to download a portable copy of your medical information. If you have questions, please visit the Frequently Asked Questions section of the [employer] website. Remember, [employer] is NOT to be used for urgent needs. For medical emergencies, dial 911. Now available from your iPhone and Android! Please provide this summary of care documentation to your next provider. Your primary care clinician is listed as Fercho Estes. If you have any questions after today's visit, please call [phone].

## 2018-01-28 NOTE — PROGRESS NOTES
Subjective:      Lisa Bradley is in the office today for cardiac reevaluation. He has a history of hypertension, chronic atrial fibrillation, sick sinus syndrome with prior permanent pacemaker implantation and dyslipidemia. In regard to his atrial fibrillation, he is on Eliquis for stroke prevention and a combination of a BB and a CCB. He has had no recent symptoms of chest pain, dyspnea, orthopnea, PND, lower extremity edema or palpitations. He continues to be physically active and walks his dog about six blocks every day. He has had no chest pain or limiting shortness of breath. He had his pacemaker checked in the office today. Patient Active Problem List    Diagnosis Date Noted    Paroxysmal a-fib [facility]) 06/30/2017    Preoperative clearance 01/01/2017    Left-sided carotid artery disease ([facility] 01/01/2017    Sick sinus syndrome ([facility] 01/01/2017    Presence of permanent cardiac pacemaker 01/01/2017    History of venous disease 01/01/2017    Essential hypertension 01/01/2017    Dyslipidemia 01/01/2017    Tobacco abuse 01/01/2017    Family history of premature CAD 01/01/2017     Current Outpatient Prescriptions   Medication Sig Dispense Refill    apixaban (ELIQUIS) 5 mg tablet Take 1 Tab by mouth two (2) times a day. 180 Tab 3    celecoxib (CELEBREX) 200 mg capsule Take  by mouth two (2) times a day.  hydrALAZINE (APRESOLINE) 25 mg tablet two (2) times a day.  cyanocobalamin (VITAMIN B12) 1,000 mcg/mL injection       omega-3 fatty acids-vitamin e (FISH OIL) 1,000 mg cap Take 1 Cap by mouth.  albuterol (VENTOLIN HFA) 90 mcg/actuation inhaler Take  by inhalation.  OXYGEN-AIR DELIVERY SYSTEMS (HORIZON NASAL CPAP SYSTEM) by Does Not Apply route.       atorvastatin (LIPITOR) 20 mg tablet       diltiazem (TIAZAC) 360 mg SR capsule       FLOVENT  mcg/actuation inhaler       metoprolol succinate (TOPROL-XL) 100 mg tablet 100 mg two (2) times a day.      warfarin (COUMADIN) 1 mg tablet       warfarin (COUMADIN) 5 mg tablet        Allergies   Allergen Reactions    Latex, Natural Rubber Contact Dermatitis    Advair Diskus [Fluticasone-Salmeterol] Nausea and Vomiting    Prednisone Hives     No past medical history on file. Past Surgical History:   Procedure Laterality Date    HX PACEMAKER       No family history on file. History   Smoking Status    Former Smoker   Smokeless Tobacco    Never Used          Review of Systems, additional:  Constitutional: negative  Eyes: negative  Respiratory: negative  Cardiovascular: negative  Gastrointestinal: negative  Musculoskeletal:negative  Neurological: negative  Behvioral/Psych: negative  Endocrine: negative  ENT: negative    Objective:     Visit Vitals    /78    Pulse 84    Ht 5' 9\" (1.753 m)    Wt 191 lb (86.6 kg)  Comment: pt reported    SpO2 98%    BMI 28.21 kg/m2     General:  alert, cooperative, no distress   Chest Wall: inspection normal - no chest wall deformities or tenderness, respiratory effort normal   Lung: clear to auscultation bilaterally   Heart:  normal rate and regular rhythm, no gallops noted   Abdomen: soft, non-tender. Bowel sounds normal. No masses,  no organomegaly   Extremities: extremities normal, atraumatic, no cyanosis or edema Skin: no rashes   Neuro: alert, oriented, normal speech, no focal findings or movement disorder noted         Assessment/Plan:       ICD-10-CM ICD-9-CM    1. Essential hypertension, controlled in office today I10 401.9    2. Left-sided carotid artery disease ([facility] followed by Dr Nicolette Almanza I77.9 447.9    3. Chronic a-fib ([facility] on Eliquis for anticoagulation, diltiazem and metoprolol for rate control. RT 6 mos  I48.0 427.31    4. Sick sinus syndrome ([facility] S/P PPM. Pacer checked today in office and functioning well. I49.5 427.81    5. Dyslipidemia E78.5 272.4    6. Family history of premature CAD Z82.49 V17.3    7.  History of venous disease, S/P vein stripping Z86.79 V12.59    8. Presence of permanent cardiac pacemaker, initial implant 2007, generator replacement 2014 all done at [facility].  Z95.0 V45.01    9.  Tobacco abuse Z72.0 305.1

## 2018-02-26 NOTE — COMMUNICATION BODY
Subjective:      Katya Mathews is in the office today for cardiac reevaluation. He has a history of hypertension, chronic atrial fibrillation, sick sinus syndrome with prior permanent pacemaker implantation and dyslipidemia. In regard to his atrial fibrillation, he is on Eliquis for stroke prevention and a combination of a BB and a CCB. He has had no recent symptoms of chest pain, dyspnea, orthopnea, PND, lower extremity edema or palpitations. He continues to be physically active and walks his dog about six blocks every day. He has had no chest pain or limiting shortness of breath. He had his pacemaker checked in the office today. Patient Active Problem List    Diagnosis Date Noted    Paroxysmal a-fib [facility]) 06/30/2017    Preoperative clearance 01/01/2017    Left-sided carotid artery disease ([facility] 01/01/2017    Sick sinus syndrome ([facility] 01/01/2017    Presence of permanent cardiac pacemaker 01/01/2017    History of venous disease 01/01/2017    Essential hypertension 01/01/2017    Dyslipidemia 01/01/2017    Tobacco abuse 01/01/2017    Family history of premature CAD 01/01/2017     Current Outpatient Prescriptions   Medication Sig Dispense Refill    apixaban (ELIQUIS) 5 mg tablet Take 1 Tab by mouth two (2) times a day. 180 Tab 3    celecoxib (CELEBREX) 200 mg capsule Take  by mouth two (2) times a day.  hydrALAZINE (APRESOLINE) 25 mg tablet two (2) times a day.  cyanocobalamin (VITAMIN B12) 1,000 mcg/mL injection       omega-3 fatty acids-vitamin e (FISH OIL) 1,000 mg cap Take 1 Cap by mouth.  albuterol (VENTOLIN HFA) 90 mcg/actuation inhaler Take  by inhalation.  OXYGEN-AIR DELIVERY SYSTEMS (HORIZON NASAL CPAP SYSTEM) by Does Not Apply route.       atorvastatin (LIPITOR) 20 mg tablet       diltiazem (TIAZAC) 360 mg SR capsule       FLOVENT  mcg/actuation inhaler       metoprolol succinate (TOPROL-XL) 100 mg tablet 100 mg two (2) times a day.      warfarin (COUMADIN) 1 mg tablet       warfarin (COUMADIN) 5 mg tablet        Allergies   Allergen Reactions    Latex, Natural Rubber Contact Dermatitis    Advair Diskus [Fluticasone-Salmeterol] Nausea and Vomiting    Prednisone Hives     No past medical history on file. Past Surgical History:   Procedure Laterality Date    HX PACEMAKER       No family history on file. History   Smoking Status    Former Smoker   Smokeless Tobacco    Never Used          Review of Systems, additional:  Constitutional: negative  Eyes: negative  Respiratory: negative  Cardiovascular: negative  Gastrointestinal: negative  Musculoskeletal:negative  Neurological: negative  Behvioral/Psych: negative  Endocrine: negative  ENT: negative    Objective:     Visit Vitals    /78    Pulse 84    Ht 5' 9\" (1.753 m)    Wt 191 lb (86.6 kg)  Comment: pt reported    SpO2 98%    BMI 28.21 kg/m2     General:  alert, cooperative, no distress   Chest Wall: inspection normal - no chest wall deformities or tenderness, respiratory effort normal   Lung: clear to auscultation bilaterally   Heart:  normal rate and regular rhythm, no gallops noted   Abdomen: soft, non-tender. Bowel sounds normal. No masses,  no organomegaly   Extremities: extremities normal, atraumatic, no cyanosis or edema Skin: no rashes   Neuro: alert, oriented, normal speech, no focal findings or movement disorder noted         Assessment/Plan:       ICD-10-CM ICD-9-CM    1. Essential hypertension, controlled in office today I10 401.9    2. Left-sided carotid artery disease ([facility] followed by Dr Tiffani Mayo I77.9 447.9    3. Chronic a-fib ([facility] on Eliquis for anticoagulation, diltiazem and metoprolol for rate control. RT 6 mos  I48.0 427.31    4. Sick sinus syndrome ([facility] S/P PPM. Pacer checked today in office and functioning well. I49.5 427.81    5. Dyslipidemia E78.5 272.4    6. Family history of premature CAD Z82.49 V17.3    7.  History of venous disease, S/P vein stripping Z86.79 V12.59    8. Presence of permanent cardiac pacemaker, initial implant 2007, generator replacement 2014 all done at [facility].  Z95.0 V45.01    9.  Tobacco abuse Z72.0 305.1

## 2018-06-25 NOTE — TELEPHONE ENCOUNTER
Last appt: 1/24/2018  Future Appointments  Date Time Provider Valery Ramsey   7/25/2018 9:00 AM Pacer Norf [facility] [address] [facility]   7/25/2018 10:00 AM Magda Costa  [facility]       Requested Prescriptions     Pending Prescriptions Disp Refills    apixaban (ELIQUIS) 5 mg tablet 180 Tab 3     Sig: Take 1 Tab by mouth two (2) times a day.

## 2018-09-12 ENCOUNTER — CLINICAL SUPPORT (OUTPATIENT)
Dept: CARDIOLOGY CLINIC | Age: 73
End: 2018-09-12

## 2018-09-12 ENCOUNTER — OFFICE VISIT (OUTPATIENT)
Dept: CARDIOLOGY CLINIC | Age: 73
End: 2018-09-12

## 2018-09-12 VITALS
OXYGEN SATURATION: 97 % | BODY MASS INDEX: 27.7 KG/M2 | DIASTOLIC BLOOD PRESSURE: 70 MMHG | HEIGHT: 69 IN | HEART RATE: 49 BPM | WEIGHT: 187 LBS | SYSTOLIC BLOOD PRESSURE: 114 MMHG

## 2018-09-12 DIAGNOSIS — I48.91 ATRIAL FIBRILLATION, UNSPECIFIED TYPE (HCC): Primary | ICD-10-CM

## 2018-09-12 DIAGNOSIS — Z95.0 PRESENCE OF PERMANENT CARDIAC PACEMAKER: ICD-10-CM

## 2018-09-12 DIAGNOSIS — I48.0 PAROXYSMAL A-FIB (HCC): Primary | ICD-10-CM

## 2018-09-12 DIAGNOSIS — I49.5 SICK SINUS SYNDROME (HCC): ICD-10-CM

## 2018-09-12 NOTE — PROGRESS NOTES
1. Have you been to the ER, urgent care clinic since your last visit? Hospitalized since your last visit? No     2. Have you seen or consulted any other health care providers outside of the [address] [place] [address] since your last visit? Include any pap smears or colon screening.   Yes [facility]

## 2018-09-12 NOTE — PROGRESS NOTES
Subjective:      Laurian Goodell is in the office today for cardiac reevaluation. He has a history of hypertension, chronic atrial fibrillation, sick sinus syndrome with prior permanent pacemaker implantation and dyslipidemia. In regard to his atrial fibrillation, he is on Eliquis for stroke prevention and a combination of a BB and a CCB. He has had no recent symptoms of chest pain, dyspnea, orthopnea, PND, lower extremity edema or palpitations. He has had no chest pain or limiting shortness of breath. He has not been walking his dog as much recently due to the heat outside. He reports that he has been doing well overall and does not express any complaints. He is having his pacemaker checked in the office today. Patient Active Problem List    Diagnosis Date Noted    Paroxysmal A-fib [facility]) 06/30/2017    Preoperative clearance 01/01/2017    Left-sided carotid artery disease ([facility] 01/01/2017    Sick sinus syndrome ([facility] 01/01/2017    Presence of permanent cardiac pacemaker 01/01/2017    History of venous disease 01/01/2017    Essential hypertension 01/01/2017    Dyslipidemia 01/01/2017    Tobacco abuse 01/01/2017    Family history of premature CAD 01/01/2017     Current Outpatient Prescriptions   Medication Sig Dispense Refill    apixaban (ELIQUIS) 5 mg tablet Take 1 Tab by mouth two (2) times a day. 180 Tab 3    hydrALAZINE (APRESOLINE) 25 mg tablet two (2) times a day.  cyanocobalamin (VITAMIN B12) 1,000 mcg/mL injection       omega-3 fatty acids-vitamin e (FISH OIL) 1,000 mg cap Take 1 Cap by mouth.  albuterol (VENTOLIN HFA) 90 mcg/actuation inhaler Take  by inhalation.  OXYGEN-AIR DELIVERY SYSTEMS (HORIZON NASAL CPAP SYSTEM) by Does Not Apply route.       atorvastatin (LIPITOR) 20 mg tablet       diltiazem (TIAZAC) 360 mg SR capsule       FLOVENT  mcg/actuation inhaler       metoprolol succinate (TOPROL-XL) 100 mg tablet 100 mg two (2) times a day. Allergies   Allergen Reactions    Latex, Natural Rubber Contact Dermatitis    Advair Diskus [Fluticasone-Salmeterol] Nausea and Vomiting    Prednisone Hives     No past medical history on file. Past Surgical History:   Procedure Laterality Date    HX PACEMAKER       No family history on file. History   Smoking Status    Former Smoker   Smokeless Tobacco    Never Used          Review of Systems, additional:  Constitutional: negative  Eyes: negative  Respiratory: negative  Cardiovascular: negative  Gastrointestinal: negative  Musculoskeletal:negative  Neurological: negative  Behvioral/Psych: negative  Endocrine: negative  ENT: negative    Objective:     Visit Vitals    /70    Pulse (!) 49    Ht 5' 9\" (1.753 m)    Wt 187 lb (84.8 kg)    SpO2 97%    BMI 27.62 kg/m2     General:  alert, cooperative, no distress   Chest Wall: inspection normal - no chest wall deformities or tenderness, respiratory effort normal   Lung: clear to auscultation bilaterally   Heart:  Normal rate, irregular rhythm, no gallops noted   Abdomen: soft, non-tender. Bowel sounds normal. No masses,  no organomegaly   Extremities: Extremities atraumatic, no edema Skin: hyperpigmentation to left lower extremity   Neuro: alert, oriented, normal speech, no focal findings or movement disorder noted         Assessment/Plan:       ICD-10-CM ICD-9-CM    1. Essential hypertension, controlled in office today I10 401.9    2. Left-sided carotid artery disease ([facility] followed by Dr Nehemias Rausch I77.9 447.9    3. Chronic a-fib ([facility] on Eliquis for anticoagulation, diltiazem and metoprolol for rate control. RT 6 mos  I48.0 427.31    4. Sick sinus syndrome ([facility] S/P PPM. Due for interrogation with today's office visit. I49.5 427.81    5. Dyslipidemia E78.5 272.4    6. Family history of premature CAD Z82.49 V17.3    7. History of venous disease, S/P vein stripping Z86.79 V12.59    8.  Presence of permanent cardiac pacemaker, initial implant 2007, generator replacement 2014 all done at [facility].  Z95.0 V45.01    9.  Tobacco abuse Z72.0 305.1

## 2018-09-12 NOTE — MR AVS SNAPSHOT
[facility] [address] [phone] 
[phone] Patient: Homa Ferrera MRN: [id number] ALR:[id number] Visit Information Date & Time Provider Department Dept. Phone Encounter #  
 9/12/2018 11:10 AM Breaden Bradley PA-C Cardio Specialist at [facility] [phone] [id number] Follow-up Instructions Return in about 6 months (around 3/12/2019). Upcoming Health Maintenance Date Due Hepatitis C Screening 1945 DTaP/Tdap/Td series (1 - Tdap) 3/22/1968 FOBT Q 1 YEAR AGE 50-75 3/22/1997 ZOSTER VACCINE AGE 60> 1/22/2007 GLAUCOMA SCREENING Q2Y 3/22/2012 Pneumococcal 65+ Low/Medium Risk (1 of 2 - PCV13) 3/22/2012 MEDICARE YEARLY EXAM 3/14/2018 Influenza Age 5 to Adult 8/1/2018 Allergies as of 9/12/2018  Review Complete On: 9/12/2018 By: Laron Beauchamp LPN Severity Noted Reaction Type Reactions Latex, Natural Rubber  01/02/2013    Contact Dermatitis Advair Diskus [Fluticasone-salmeterol]  01/02/2013    Nausea and Vomiting Prednisone  01/02/2013    Hives Current Immunizations  Never Reviewed No immunizations on file. Not reviewed this visit You Were Diagnosed With   
  
 Codes Comments Paroxysmal A-fib (HCC)    -  Primary ICD-10-CM: I48.0 ICD-9-CM: 427.31 Vitals BP Pulse Height(growth percentile) Weight(growth percentile) SpO2 BMI  
 114/70 (!) 49 5' 9\" (1.753 m) 187 lb (84.8 kg) 97% 27.62 kg/m2 Smoking Status Former Smoker Vitals History BMI and BSA Data Body Mass Index Body Surface Area  
 27.62 kg/m 2 2.03 m 2 Preferred Pharmacy Pharmacy Name Phone [facility] [address] [facility] [address], [address] [place] [address] [phone] Your Updated Medication List  
  
   
This list is accurate as of 9/12/18 11:29 AM.  Always use your most recent med list.  
  
  
  
  
 apixaban 5 mg tablet Commonly known as:  Kris Christen Take 1 Tab by mouth two (2) times a day. atorvastatin 20 mg tablet Commonly known as:  LIPITOR  
  
 cyanocobalamin 1,000 mcg/mL injection Commonly known as:  VITAMIN B12  
  
 dilTIAZem 360 mg SR capsule Commonly known as:  [facility] FISH OIL 1,000 mg Cap Generic drug:  omega-3 fatty acids-vitamin e Take 1 Cap by mouth. FLOVENT  mcg/actuation inhaler Generic drug:  fluticasone HORIZON NASAL CPAP SYSTEM  
by Does Not Apply route. hydrALAZINE 25 mg tablet Commonly known as:  APRESOLINE  
two (2) times a day. metoprolol succinate 100 mg tablet Commonly known as:  TOPROL-XL  
100 mg two (2) times a day. VENTOLIN HFA 90 mcg/actuation inhaler Generic drug:  albuterol Take  by inhalation. We Performed the Following AMB POC EKG ROUTINE W/ 12 LEADS, INTER & REP [91112 CPT(R)] Follow-up Instructions Return in about 6 months (around 3/12/2019). Introducing [facility]! [facility] introduces [employer] patient portal. Now you can access parts of your medical record, email your doctor's office, and request medication refills online. 1. In your internet browser, go to [email or web address] 2. Click on the First Time User? Click Here link in the Sign In box. You will see the New Member Sign Up page. 3. Enter your [employer] Access Code exactly as it appears below. You will not need to use this code after youve completed the sign-up process. If you do not sign up before the expiration date, you must request a new code. · [employer] Access Code: [email or web address] Expires: 12/11/2018 11:29 AM 
 
4. Enter the last four digits of your Social Security Number (xxxx) and Date of Birth (mm/dd/yyyy) as indicated and click Submit. You will be taken to the next sign-up page. 5. Create a [employer] ID.  This will be your [employer] login ID and cannot be changed, so think of one that is secure and easy to remember. 6. Create a [employer]) password. You can change your password at any time. 7. Enter your Password Reset Question and Answer. This can be used at a later time if you forget your password. 8. Enter your e-mail address. You will receive e-mail notification when new information is available in [address] 9. Click Sign Up. You can now view and download portions of your medical record. 10. Click the Download Summary menu link to download a portable copy of your medical information. If you have questions, please visit the Frequently Asked Questions section of the [employer] website. Remember, [employer] is NOT to be used for urgent needs. For medical emergencies, dial 911. Now available from your iPhone and Android! Please provide this summary of care documentation to your next provider. Your primary care clinician is listed as Alma Villarreal. If you have any questions after today's visit, please call [phone].

## 2019-02-08 ENCOUNTER — HOSPITAL ENCOUNTER (OUTPATIENT)
Dept: ULTRASOUND IMAGING | Age: 74
Discharge: HOME | End: 2019-02-08
Attending: FAMILY MEDICINE
Payer: MEDICARE

## 2019-02-08 DIAGNOSIS — Z13.6 SCREENING FOR ISCHEMIC HEART DISEASE: ICD-10-CM

## 2019-02-08 PROCEDURE — 76706 US ABDL AORTA SCREEN AAA: CPT

## 2019-03-13 ENCOUNTER — CLINICAL SUPPORT (OUTPATIENT)
Dept: CARDIOLOGY CLINIC | Age: 74
End: 2019-03-13

## 2019-03-13 ENCOUNTER — OFFICE VISIT (OUTPATIENT)
Dept: CARDIOLOGY CLINIC | Age: 74
End: 2019-03-13

## 2019-03-13 VITALS
HEART RATE: 75 BPM | SYSTOLIC BLOOD PRESSURE: 129 MMHG | OXYGEN SATURATION: 99 % | BODY MASS INDEX: 28.5 KG/M2 | WEIGHT: 193 LBS | DIASTOLIC BLOOD PRESSURE: 71 MMHG

## 2019-03-13 DIAGNOSIS — Z86.79 HISTORY OF VENOUS DISEASE: ICD-10-CM

## 2019-03-13 DIAGNOSIS — Z72.0 TOBACCO ABUSE: ICD-10-CM

## 2019-03-13 DIAGNOSIS — E78.5 DYSLIPIDEMIA: ICD-10-CM

## 2019-03-13 DIAGNOSIS — Z95.0 PRESENCE OF PERMANENT CARDIAC PACEMAKER: ICD-10-CM

## 2019-03-13 DIAGNOSIS — Z82.49 FAMILY HISTORY OF PREMATURE CAD: ICD-10-CM

## 2019-03-13 DIAGNOSIS — I10 ESSENTIAL HYPERTENSION: Primary | ICD-10-CM

## 2019-03-13 DIAGNOSIS — I49.5 SICK SINUS SYNDROME (HCC): ICD-10-CM

## 2019-03-13 DIAGNOSIS — I77.9 LEFT-SIDED CAROTID ARTERY DISEASE, UNSPECIFIED TYPE (HCC): Primary | ICD-10-CM

## 2019-03-13 DIAGNOSIS — I48.0 PAROXYSMAL A-FIB (HCC): ICD-10-CM

## 2019-03-13 DIAGNOSIS — I77.9 LEFT-SIDED CAROTID ARTERY DISEASE, UNSPECIFIED TYPE (HCC): ICD-10-CM

## 2019-03-13 RX ORDER — MELATONIN
DAILY
COMMUNITY

## 2019-03-13 NOTE — PROGRESS NOTES
1. Have you been to the ER, urgent care clinic since your last visit? Hospitalized since your last visit? no    2. Have you seen or consulted any other health care providers outside of the [address] [place] [address] since your last visit? Include any pap smears or colon screening.  no

## 2019-03-17 NOTE — PROGRESS NOTES
Subjective:      Marco Colon is in the office today for cardiac reevaluation. He has a history of hypertension, chronic atrial fibrillation,sick sinus syndrome with prior permanent pacemaker implantation and dyslipidemia. In regard to his atrial fibrillation, he has been maintained on Eliquis for stroke prevention and a combination of a BB and a CCB for rate control. He has had no recent symptoms of chest pain, dyspnea, orthopnea, PND or palpitations. In the office today reports that he has had some wheezing from time to time. He has an inhaler which he does not use every day. He does have some mild peripheral swelling from time to time which is essentially unchanged                     Patient Active Problem List    Diagnosis Date Noted    Paroxysmal A-fib [facility]) 06/30/2017    Preoperative clearance 01/01/2017    Left-sided carotid artery disease ([facility] 01/01/2017    Sick sinus syndrome ([facility] 01/01/2017    Presence of permanent cardiac pacemaker 01/01/2017    History of venous disease 01/01/2017    Essential hypertension 01/01/2017    Dyslipidemia 01/01/2017    Tobacco abuse 01/01/2017    Family history of premature CAD 01/01/2017     Current Outpatient Medications   Medication Sig Dispense Refill    cholecalciferol (VITAMIN D3) 1,000 unit tablet Take  by mouth daily.  apixaban (ELIQUIS) 5 mg tablet Take 1 Tab by mouth two (2) times a day. 180 Tab 3    hydrALAZINE (APRESOLINE) 25 mg tablet two (2) times a day.  cyanocobalamin (VITAMIN B12) 1,000 mcg/mL injection       omega-3 fatty acids-vitamin e (FISH OIL) 1,000 mg cap Take 1 Cap by mouth.  albuterol (VENTOLIN HFA) 90 mcg/actuation inhaler Take  by inhalation.  OXYGEN-AIR DELIVERY SYSTEMS (HORIZON NASAL CPAP SYSTEM) by Does Not Apply route.       atorvastatin (LIPITOR) 20 mg tablet       diltiazem (TIAZAC) 360 mg SR capsule       FLOVENT  mcg/actuation inhaler       metoprolol succinate (TOPROL-XL) 100 mg tablet 100 mg two (2) times a day. Allergies   Allergen Reactions    Latex, Natural Rubber Contact Dermatitis    Advair Diskus [Fluticasone Propion-Salmeterol] Nausea and Vomiting    Prednisone Hives     No past medical history on file. Past Surgical History:   Procedure Laterality Date    HX PACEMAKER       No family history on file. Social History     Tobacco Use   Smoking Status Former Smoker   Smokeless Tobacco Never Used          Review of Systems, additional:  Constitutional: negative  Eyes: negative  Respiratory: negative  Cardiovascular: negative  Gastrointestinal: negative  Musculoskeletal:negative  Neurological: negative  Behvioral/Psych: negative  Endocrine: negative  ENT: negative    Objective:     Visit Vitals  /71   Pulse 75   Wt 193 lb (87.5 kg)   SpO2 99%   BMI 28.50 kg/m²     General:  alert, cooperative, no distress   Chest Wall: inspection normal - no chest wall deformities or tenderness, respiratory effort normal   Lung: clear to auscultation bilaterally   Heart:  normal rate and regular rhythm, no gallops noted   Abdomen: soft, non-tender. Bowel sounds normal. No masses,  no organomegaly   Extremities: extremities normal, atraumatic, no cyanosis or edema Skin: no rashes   Neuro: alert, oriented, normal speech, no focal findings or movement disorder noted         Assessment/Plan:       ICD-10-CM ICD-9-CM    1. Essential hypertension, controlled in office today I10 401.9    2. Left-sided carotid artery disease ([facility] followed by vascular I77.9 447.9    3. Chronic a-fib ([facility] on Eliquis for anticoagulation, diltiazem and metoprolol for rate control. RT 6 mos  I48.0 427.31    4. Sick sinus syndrome ([facility] S/P PPM. I49.5 427.81    5. Dyslipidemia E78.5 272.4    6. Family history of premature CAD Z82.49 V17.3    7. History of venous disease, S/P vein stripping Z86.79 V12.59    8.  Presence of permanent cardiac pacemaker, initial implant 2007, generator replacement 2014 all done at [facility].  Z95.0 V45.01    9.  Tobacco abuse Z72.0 305.1

## 2019-06-24 ENCOUNTER — TELEPHONE (OUTPATIENT)
Dept: CARDIOLOGY CLINIC | Age: 74
End: 2019-06-24

## 2019-06-24 NOTE — TELEPHONE ENCOUNTER
Will make Dr. Daniella Rodriguez aware of message and call patient with instructions. Per Dr. Daniella Rodriguez, patient is okay to hold Eliquis 48 hours prior to procedure. Will fax letter to Dr. Rose Marie Cormier at [phone].         Verbal order and read back per Joseph Bowman MD

## 2019-06-28 ENCOUNTER — TELEPHONE (OUTPATIENT)
Dept: CARDIOLOGY CLINIC | Age: 74
End: 2019-06-28

## 2019-06-28 NOTE — TELEPHONE ENCOUNTER
Patient needs to hold Eliquis for 48 hours for upcoming colonoscopy and EGD with Dr. Vicky Muñoz. Verbal order and read back per Helen Tello MD  [facility] for patient to hold Eliquis 48 hours prior.     LTR done and faxed to [phone]

## 2019-07-16 ENCOUNTER — ANESTHESIA EVENT (OUTPATIENT)
Dept: ENDOSCOPY | Age: 74
End: 2019-07-16
Payer: MEDICARE

## 2019-07-16 NOTE — PERIOP NOTES
PAT - SURGICAL PRE-ADMISSION INSTRUCTIONS    NAME:  Pam Aldana                                                          TODAY'S DATE:  7/16/2019    SURGERY DATE:  7/17/2019                                  SURGERY ARRIVAL TIME:   0730 PER OFFICE    1. Do NOT eat or drink anything, including candy or gum, after MIDNIGHT on 7/16/19 , unless you have specific instructions from your Surgeon or Anesthesia Provider to do so. 2. No smoking on the day of surgery. 3. No alcohol 24 hours prior to the day of surgery. 4. No recreational drugs for one week prior to the day of surgery. 5. Leave all valuables, including money/purse, at home. 6. Remove all jewelry, nail polish, makeup (including mascara); no lotions, powders, deodorant, or perfume/cologne/after shave. 7. Glasses/Contact lenses and Dentures may be worn to the hospital.  They will be removed prior to surgery. 8. Call your doctor if symptoms of a cold or illness develop within 24 ours prior to surgery. 9. AN ADULT MUST DRIVE YOU HOME AFTER OUTPATIENT SURGERY. 10. If you are having an OUTPATIENT procedure, please make arrangements for a responsible adult to be with you for 24 hours after your surgery. 11. If you are admitted to the hospital, you will be assigned to a bed after surgery is complete. Normally a family member will not be able to see you until you are in your assigned bed. 15. Family is encouraged to accompany you to the hospital.  We ask visitors in the treatment area to be limited to ONE person at a time to ensure patient privacy. EXCEPTIONS WILL BE MADE AS NEEDED. 15. Children under 12 are discouraged from entering the treatment area and need to be supervised by an adult when in the waiting room. Special Instructions:    Bring inhaler. , Take these medications the morning of surgery with a sip of water:  METOPROLOL, DILTIAZEM, HYDRALAZINE, INHALER, Complete bowel prep per MD instructions., STOP anticoagulants AT LEAST 1 WEEK PRIOR to your surgery or, follow other MD instructions:  PRESTON    Patient Prep:    shower with anti-bacterial soap    These surgical instructions were reviewed with PATIENT during the PAT PHONE CALL. Directions: On the morning of surgery, please go to the [facility]. Enter the building from the [facility] entrance, 1st floor (next to the Emergency Room entrance). Take the elevator to the 2nd floor. Sign in at the Registration Desk.     If you have any questions and/or concerns, please do not hesitate to call:  (Prior to the day of surgery)  [facility] unit:  [phone]  (Day of surgery)  [facility] unit:  [phone]

## 2019-07-17 ENCOUNTER — HOSPITAL ENCOUNTER (OUTPATIENT)
Age: 74
Setting detail: OUTPATIENT SURGERY
Discharge: HOME | End: 2019-07-17
Attending: INTERNAL MEDICINE | Admitting: INTERNAL MEDICINE
Payer: MEDICARE

## 2019-07-17 ENCOUNTER — ANESTHESIA (OUTPATIENT)
Dept: ENDOSCOPY | Age: 74
End: 2019-07-17
Payer: MEDICARE

## 2019-07-17 VITALS
DIASTOLIC BLOOD PRESSURE: 66 MMHG | HEART RATE: 62 BPM | HEIGHT: 69 IN | SYSTOLIC BLOOD PRESSURE: 113 MMHG | WEIGHT: 181 LBS | TEMPERATURE: 97.7 F | RESPIRATION RATE: 18 BRPM | OXYGEN SATURATION: 99 % | BODY MASS INDEX: 26.81 KG/M2

## 2019-07-17 PROBLEM — R19.5 POSITIVE COLORECTAL CANCER SCREENING USING COLOGUARD TEST: Status: ACTIVE | Noted: 2019-07-17

## 2019-07-17 PROBLEM — K21.9 GASTROESOPHAGEAL REFLUX DISEASE: Status: ACTIVE | Noted: 2019-07-17

## 2019-07-17 LAB
ATRIAL RATE: 61 BPM
CALCULATED R AXIS, ECG10: 79 DEGREES
CALCULATED T AXIS, ECG11: -19 DEGREES
DIAGNOSIS, 93000: NORMAL
Q-T INTERVAL, ECG07: 406 MS
QRS DURATION, ECG06: 86 MS
QTC CALCULATION (BEZET), ECG08: 412 MS
VENTRICULAR RATE, ECG03: 62 BPM

## 2019-07-17 PROCEDURE — 76040000007: Performed by: INTERNAL MEDICINE

## 2019-07-17 PROCEDURE — 74011250636 HC RX REV CODE- 250/636

## 2019-07-17 PROCEDURE — 77030038604 HC SNR ENDO EXACTO USEN -B: Performed by: INTERNAL MEDICINE

## 2019-07-17 PROCEDURE — 74011000250 HC RX REV CODE- 250

## 2019-07-17 PROCEDURE — 88305 TISSUE EXAM BY PATHOLOGIST: CPT

## 2019-07-17 PROCEDURE — 74011250636 HC RX REV CODE- 250/636: Performed by: NURSE ANESTHETIST, CERTIFIED REGISTERED

## 2019-07-17 PROCEDURE — 93005 ELECTROCARDIOGRAM TRACING: CPT

## 2019-07-17 PROCEDURE — 77030021593 HC FCPS BIOP ENDOSC BSC -A: Performed by: INTERNAL MEDICINE

## 2019-07-17 PROCEDURE — 77030031670 HC DEV INFL ENDOTEK BIG60 MRTM -B: Performed by: INTERNAL MEDICINE

## 2019-07-17 PROCEDURE — 74011000250 HC RX REV CODE- 250: Performed by: ANESTHESIOLOGY

## 2019-07-17 PROCEDURE — 76060000032 HC ANESTHESIA 0.5 TO 1 HR: Performed by: INTERNAL MEDICINE

## 2019-07-17 RX ORDER — ALBUTEROL SULFATE 0.83 MG/ML
2.5 SOLUTION RESPIRATORY (INHALATION)
Status: DISCONTINUED | OUTPATIENT
Start: 2019-07-17 | End: 2019-07-17 | Stop reason: HOSPADM

## 2019-07-17 RX ORDER — SODIUM CHLORIDE 0.9 % (FLUSH) 0.9 %
5-40 SYRINGE (ML) INJECTION EVERY 8 HOURS
Status: DISCONTINUED | OUTPATIENT
Start: 2019-07-17 | End: 2019-07-17 | Stop reason: HOSPADM

## 2019-07-17 RX ORDER — ALBUTEROL SULFATE 0.83 MG/ML
2.5 SOLUTION RESPIRATORY (INHALATION)
Status: COMPLETED | OUTPATIENT
Start: 2019-07-17 | End: 2019-07-17

## 2019-07-17 RX ORDER — SODIUM CHLORIDE, SODIUM LACTATE, POTASSIUM CHLORIDE, CALCIUM CHLORIDE 600; 310; 30; 20 MG/100ML; MG/100ML; MG/100ML; MG/100ML
50 INJECTION, SOLUTION INTRAVENOUS CONTINUOUS
Status: DISCONTINUED | OUTPATIENT
Start: 2019-07-17 | End: 2019-07-17 | Stop reason: HOSPADM

## 2019-07-17 RX ORDER — PROPOFOL 10 MG/ML
INJECTION, EMULSION INTRAVENOUS AS NEEDED
Status: DISCONTINUED | OUTPATIENT
Start: 2019-07-17 | End: 2019-07-17 | Stop reason: HOSPADM

## 2019-07-17 RX ORDER — INSULIN LISPRO 100 [IU]/ML
INJECTION, SOLUTION INTRAVENOUS; SUBCUTANEOUS ONCE
Status: DISCONTINUED | OUTPATIENT
Start: 2019-07-17 | End: 2019-07-17 | Stop reason: HOSPADM

## 2019-07-17 RX ORDER — DEXTROMETHORPHAN/PSEUDOEPHED 2.5-7.5/.8
1.2 DROPS ORAL
Status: DISCONTINUED | OUTPATIENT
Start: 2019-07-17 | End: 2019-07-17 | Stop reason: HOSPADM

## 2019-07-17 RX ORDER — LIDOCAINE HYDROCHLORIDE 20 MG/ML
INJECTION, SOLUTION EPIDURAL; INFILTRATION; INTRACAUDAL; PERINEURAL AS NEEDED
Status: DISCONTINUED | OUTPATIENT
Start: 2019-07-17 | End: 2019-07-17 | Stop reason: HOSPADM

## 2019-07-17 RX ORDER — EPHEDRINE SULFATE 50 MG/ML
INJECTION, SOLUTION INTRAVENOUS AS NEEDED
Status: DISCONTINUED | OUTPATIENT
Start: 2019-07-17 | End: 2019-07-17 | Stop reason: HOSPADM

## 2019-07-17 RX ORDER — SODIUM CHLORIDE 9 MG/ML
25 INJECTION, SOLUTION INTRAVENOUS CONTINUOUS
Status: DISCONTINUED | OUTPATIENT
Start: 2019-07-17 | End: 2019-07-17 | Stop reason: HOSPADM

## 2019-07-17 RX ORDER — SODIUM CHLORIDE 0.9 % (FLUSH) 0.9 %
5-40 SYRINGE (ML) INJECTION AS NEEDED
Status: DISCONTINUED | OUTPATIENT
Start: 2019-07-17 | End: 2019-07-17 | Stop reason: HOSPADM

## 2019-07-17 RX ORDER — ALBUTEROL SULFATE 0.83 MG/ML
SOLUTION RESPIRATORY (INHALATION)
Status: COMPLETED
Start: 2019-07-17 | End: 2019-07-17

## 2019-07-17 RX ADMIN — SODIUM CHLORIDE, SODIUM LACTATE, POTASSIUM CHLORIDE, AND CALCIUM CHLORIDE 50 ML/HR: 600; 310; 30; 20 INJECTION, SOLUTION INTRAVENOUS at 08:03

## 2019-07-17 RX ADMIN — PROPOFOL 20 MG: 10 INJECTION, EMULSION INTRAVENOUS at 09:50

## 2019-07-17 RX ADMIN — PROPOFOL 20 MG: 10 INJECTION, EMULSION INTRAVENOUS at 09:47

## 2019-07-17 RX ADMIN — EPHEDRINE SULFATE 20 MG: 50 INJECTION, SOLUTION INTRAVENOUS at 10:14

## 2019-07-17 RX ADMIN — EPHEDRINE SULFATE 10 MG: 50 INJECTION, SOLUTION INTRAVENOUS at 09:59

## 2019-07-17 RX ADMIN — PROPOFOL 50 MG: 10 INJECTION, EMULSION INTRAVENOUS at 09:36

## 2019-07-17 RX ADMIN — LIDOCAINE HYDROCHLORIDE 60 MG: 20 INJECTION, SOLUTION EPIDURAL; INFILTRATION; INTRACAUDAL; PERINEURAL at 09:36

## 2019-07-17 RX ADMIN — ALBUTEROL SULFATE 2.5 MG: 2.5 SOLUTION RESPIRATORY (INHALATION) at 11:26

## 2019-07-17 RX ADMIN — PROPOFOL 50 MG: 10 INJECTION, EMULSION INTRAVENOUS at 09:41

## 2019-07-17 RX ADMIN — EPHEDRINE SULFATE 15 MG: 50 INJECTION, SOLUTION INTRAVENOUS at 10:20

## 2019-07-17 RX ADMIN — ALBUTEROL SULFATE 2.5 MG: 2.5 SOLUTION RESPIRATORY (INHALATION) at 11:03

## 2019-07-17 RX ADMIN — PROPOFOL 50 MG: 10 INJECTION, EMULSION INTRAVENOUS at 09:37

## 2019-07-17 RX ADMIN — PROPOFOL 20 MG: 10 INJECTION, EMULSION INTRAVENOUS at 09:55

## 2019-07-17 NOTE — PROCEDURES
Colonoscopy Report    Patient: Nigel Brewster MRN: [id number]  SSN: [id number]    YOB: 1945  Age: 69 y.o. Sex: male      Date of Procedure: 7/17/2019    IMPRESSION:  1. Good prep----normal terminal ileal orfice  2.  2 polyps removed with cold snare----4-5 mm sessile polyps at proximal left colon and 25 cm  3.  3 mm polyp at 50 cm removed with cold bx  4. Moderate divertculosis sigmoid and left colon  5. Small hemorrhoids in the anus      RECOMMENDATIONS:  1. Call me in 2-3 weeks for polyp biopsy results if not received beforehand. If adenomas or hyperplastic, then repeat colonoscopy in 5 year  B/o adenomas in the past         Indication:  5 year surveillance for adenomas  History: The history and physical exam were reviewed and updated. Procedure Performed: Colonoscopy biopsy, polypectomy (cold snare)  Endoscopist: Corey Del Rio MD  Assistant: Endoscopy Technician-1: Cindi Castano  Endoscopy RN-1: Mahsa Haq RN   ASA: ASA 2 - Patient with mild systemic disease with no functional limitations  Mallampati Score: II (soft palate, uvula, fauces visible)  Sedation:  MAC anesthesia  Endoscope: [id number]  Extent of Examination:terminal ileum  Quality of Preparation: good    Technique: The procedure was discussed with the patient including risks, benefits, alternatives including risks of IV sedation, bleeding, perforation and missed polyp. A safety timeout was performed. The patient was placed in left lateral position. The patient was given incremental doses of IV medication to achieve moderate  sedation. The patients vital signs were monitored at all times including heart rate, rhythm, blood pressure and oxygen saturation. When adequate sedation was achieved a perianal inspection and a digital rectal exam were performed. The video colonoscope was introduced into the rectum and advanced under direct vision up to the cecum and into the terminal ileal orfice.   The cecum was identified by IC valve, appendiceal orifice and convergence of the tineal folds. Careful examination of the colonic mucosa was then performed on slow withdrawal of the endoscope. Retroflexion was performed in the rectum and the distal rectum visualized as was the anal canal.  The patient tolerated the procedure very well and was transferred to recovery area.      Findings:  See impression above  EBL: minimal  Specimen:   ID Type Source Tests Collected by Time Destination   1 : bx esophageal ulcerations Preservative Esophagus  Tay Lau MD 7/17/2019 0940 Pathology   2 : cold snare polyp proximal left colon Preservative Colon, Descending  Tay Lau MD 7/17/2019 0955 Pathology   3 : bx polyp@ 55 cm colon Preservative Colon  Tay Lau MD 7/17/2019 0596 Pathology   4 : cold snare polyp@ 25 cm Preservative Colon  Tay Lau MD 7/17/2019 8160 Pathology

## 2019-07-17 NOTE — PERIOP NOTES
O2 maintaing at 95%, and up to 99% at times.   Dr Stephanie Zhou aware ok to discharge, pt and wife understand to use inhaler at home

## 2019-07-17 NOTE — ANESTHESIA POSTPROCEDURE EVALUATION
Procedure(s):  COLONOSCOPY  ESOPHAGOGASTRODUODENOSCOPY (EGD).     MAC    Anesthesia Post Evaluation      Multimodal analgesia: multimodal analgesia used between 6 hours prior to anesthesia start to PACU discharge  Patient location during evaluation: PACU  Patient participation: complete - patient participated  Level of consciousness: awake  Pain score: 0  Pain management: adequate  Airway patency: patent  Anesthetic complications: no  Cardiovascular status: acceptable  Respiratory status: acceptable  Hydration status: acceptable  Post anesthesia nausea and vomiting:  none      Vitals Value Taken Time   BP 90/45 7/17/2019 10:10 AM   Temp     Pulse 62 7/17/2019 10:10 AM   Resp 18 7/17/2019 10:10 AM   SpO2 97 % 7/17/2019 10:10 AM

## 2019-07-17 NOTE — PERIOP NOTES
Pre-Op Summary    Pt arrived via car with family/friend and is oriented to time, place, person and situation. Patient with steady gait with none assistive devices. Visit Vitals  /89   Pulse 69   Temp 97.7 °F (36.5 °C)   Resp 16   Ht 5' 9\" (1.753 m)   Wt 82.1 kg (181 lb)   SpO2 99%   BMI 26.73 kg/m²       Peripheral IV located on Right hand . Patients belongings are located with wife. Patient's point of contact is Ben Brown and their contact number is: [phone]. They will be in the waiting room. They are able to receive medication information. They will be their ride home.

## 2019-07-17 NOTE — ROUTINE PROCESS
Patient taken to recovery by this RN and CRNA, bedside report given to Renee HERNANDES. Patient stable and on monitor.

## 2019-07-17 NOTE — ANESTHESIA PREPROCEDURE EVALUATION
Relevant Problems   No relevant active problems       Anesthetic History               Review of Systems / Medical History  Patient summary reviewed and pertinent labs reviewed    Pulmonary    COPD: moderate    Sleep apnea: CPAP           Neuro/Psych         TIA     Cardiovascular    Hypertension: well controlled        Dysrhythmias : atrial fibrillation  Pacemaker    Exercise tolerance: >4 METS     GI/Hepatic/Renal     GERD: well controlled           Endo/Other  Within defined limits           Other Findings              Physical Exam    Airway  Mallampati: III  TM Distance: 4 - 6 cm  Neck ROM: decreased range of motion   Mouth opening: Normal     Cardiovascular    Rhythm: regular  Rate: normal         Dental    Dentition: Poor dentition     Pulmonary  Breath sounds clear to auscultation               Abdominal  GI exam deferred       Other Findings            Anesthetic Plan    ASA: 3  Anesthesia type: MAC            Anesthetic plan and risks discussed with: Patient

## 2019-07-17 NOTE — DISCHARGE INSTRUCTIONS
RECOMMENDATIONS:  1. Call me in 2-3 weeks for polyp biopsy results if not received beforehand. If adenomas or hyperplastic, then repeat colonoscopy in 5 year  B/o adenomas in the past  RECOMMENDATIONS:  1. Omeprazole 40 mg each AM 30 mins before breakfast daily  2. Call me in 2-3 weeks for esophageal biopsy results if not received beforehand  3. Repeat EGD after 3 months of Omeprazole to confirm healing and r/o Shook's      DISCHARGE SUMMARY from Nurse    PATIENT INSTRUCTIONS:    After general anesthesia or intravenous sedation, for 24 hours or while taking prescription Narcotics:  · Limit your activities  · Do not drive and operate hazardous machinery  · Do not make important personal or business decisions  · Do  not drink alcoholic beverages  · If you have not urinated within 8 hours after discharge, please contact your surgeon on call. Report the following to your surgeon:  · Excessive pain, swelling, redness or odor of or around the surgical area  · Temperature over 100.5  · Nausea and vomiting lasting longer than 4 hours or if unable to take medications  · Any signs of decreased circulation or nerve impairment to extremity: change in color, persistent  numbness, tingling, coldness or increase pain  · Any questions    What to do at Home:      No smoking/ No tobacco products/ Avoid exposure to second hand smoke  Surgeon General's Warning:  Quitting smoking now greatly reduces serious risk to your health. Obesity, smoking, and sedentary lifestyle greatly increases your risk for illness    A healthy diet, regular physical exercise & weight monitoring are important for maintaining a healthy lifestyle    You may be retaining fluid if you have a history of heart failure or if you experience any of the following symptoms:  Weight gain of 3 pounds or more overnight or 5 pounds in a week, increased swelling in our hands or feet or shortness of breath while lying flat in bed.   Please call your doctor as soon as you notice any of these symptoms; do not wait until your next office visit. The discharge information has been reviewed with the patient. The patient verbalized understanding. Discharge medications reviewed with the patient and appropriate educational materials and side effects teaching were provided. Patient armband removed and given to patient to take home.   Patient was informed of the privacy risks if armband lost or stolen no loss of consciousness, no gait abnormality, no headache, no sensory deficits, and no weakness.

## 2019-07-17 NOTE — PROCEDURES
Endoscopy Procedure Note    Patient: Flex Rios MRN: [id number]  SSN: [id number]    YOB: 1945  Age: 69 y.o. Sex: male      Date/Time:  7/17/2019 10:01 AM    Esophagogastroduodenoscopy (EGD) Procedure Report    Procedure: Esophagogastroduodenoscopy with biopsy    IMPRESSION:   1. Moderately severe ulcerative esophagitis in the distal 7-8 cm of the esophagus above a small hiatal hernia--bx'd to r/o neoplasm  2. Normal mucosa of the stomach and duodenal bulb/second portion    RECOMMENDATIONS:  1. Omeprazole 40 mg each AM 30 mins before breakfast daily  2. Call me in 2-3 weeks for esophageal biopsy results if not received beforehand  3. Repeat EGD after 3 months of Omeprazole to confirm healing and r/o Shook's    Indication: poorly controlled GERD  :  Noble Mar MD  Referring Provider:   Phoenix Zarco MD  Assistants: Endoscopy Technician-1: Alexander Martin  Endoscopy RN-1: Dmitry Duran RN  History: The history and physical exam were reviewed and updated. Endoscope: [id number]  ASA: ASA 2 - Patient with mild systemic disease with no functional limitations  Mallampati: II (soft palate, uvula, fauces visible)  Sedation:  MAC anesthesia    Description of the procedure: The procedure was discussed with the patient including risks, benefits, alternatives including risks of iv sedation, bleeding, perforation and aspiration. A safety timeout was performed. The patient was placed in the left lateral decubitus position. A bite block was placed. The patient was given incremental doses of intravenous medication until moderate sedation was achieved. The patients vital signs were monitored at all times including heart rate/rhythm, blood pressure and oxygen saturation. The endoscope was then passed under direct visualization into the esophagus, across the GE junction into the stomach and through the pylorus into the duodenal bulb and second portion.   The endoscope was then slowly withdrawn while visualizing the mucosa. In the stomach a retroflexion was performed and gastric fundus and cardia visualized. .The endoscope was then slowly withdrawn. The patient was then transferred to recovery in stable condition. Findings: see impression above    Complications:   none    EBL minimal    Discharge disposition:  Home in the company of  when able to ambulate    Robbin Lau MD, ARTURO Gallardo  July 17, 2019  10:01 AM

## 2019-07-17 NOTE — H&P
Reason for Appointment   1. NP O/V + Cologuard   2. Screening colonoscopy     History of Present Illness   encounter:   5/22/19: Mr. Chris Solorio is a 69year-old male who is here for colon cancer screening. He has not had a colonoscopy in a long time. He did have a Cologuard test performed recently that was positive. He reports having what sounds like FIOBT through the [address] on the same bowel movement specimen, and this was negative. He denies any rectal bleeding, changes in bowel habits or abdominal pain. Patient does have a history of chronic heartburn. He does not take NSAIDs on a regular basis. He believes he had a sigmoidoscopy in 2007 through the [facility] but doesn't remember the circumstances under which that was performed. He does have indigestion, heartburn only with trigger foods at baseline. He has been treating symptoms with dietary changes and occasional Tums or Rolaids. In the last 6 months to a year; however, the symptoms have worsened to the point he started taking Prilosec on a daily basis in order to avoid symptoms of heartburn, indigestion at least 2 or 3 times a week. He has no trouble swallowing. He has never had a one time screening EGD to rule out Shook's esophagus. He denies chronic recent abdominal pain, constipation. He does have occasional diarrhea often times associated with lactose ingestion. Current Medications   Taking    Fish Oil 1000 mg capsule 1 cap(s) orally 2 times a day    Taztia  mg/24 hours capsule, extended release 1 cap(s) orally once a day    Eliquis 5 mg tablet tab(s) orally BID    Vitamin D 1000 IU cap 1 cap po qd    hydralazine 25 mg tablet 1 tab(s) orally 4 times a day    Metoprolol Succinate  mg tablet, extended release 1 tab(s) orally once a day    atorvastatin 20 mg tablet 1 tab(s) orally once a day    Vitamin B 12 250 mg 1 tab qd    Medication List reviewed and reconciled with the patient      Past Medical History   Stroke. Hypertension. Surgical History   Appendectomy    Pacemaker    Vein stripping    Ear surgery - Skin cancer      Family History   Father:    Mother:    No known family history of any chronic GI illness including no esophageal, gastric or colon cancer. Social History   Occupation: Retired. no Drugs (Illicit). Smoking   Tobacco use: never smoker  no Alcohol. no Blood transfusions. Marital Status: . Allergies   Prevacid: hives - Allergy - Onset Date 2019   Latex: hives - Onset Date 2019     Review of Systems   Complete review of systems was taken and reviewed with the patient on and will be scanned into the medical record. Positives will be noted in HPI. Negatives will not be listed here. Vital Signs   BMI 27.91, HR 49, /66, Wt 189, Ht 5'9\", RR 16.     Examination   General examination:  LABORATORY DATA: Lab work May 2019: WBC 7.2, hemoglobin 15.7 hematocrit 47.8, MCV 91, platelets 878. A BMP normal except for GFR estimated low 56. Triglycerides 89, LDL 74. Hepatic function profile normal including a total protein is 7.5, albumin of 4.5, total bilirubin 0.6, alkaline phosphatase is of 80, AST 23, ALT 22. TSH 3.180, hepatitis see antibody negative. Remona Mellow Physical Examination   General: Pleasant, healthy-appearing male, walking with a cane in no obvious distress. Neck: Supple. No thyromegaly or mass palpable. Nodes: No supraclavicular, axillary, cervical nodes palpable. Chest: Clear to auscultation symmetric breath sounds. Good air movement. Heart: First and second heart sounds are normal. No murmurs, rubs, gallops. Abdomen: Soft, nontender. No organomegaly or mass palpable. Bowel sounds are normal and there no bruits. Extremities: No pedal edema. Assessments     1. Encounter for screening colonoscopy - Z12.11 (Primary), Average risk by personal and family history. The patient is behind on having routine colonoscopy exams.  He did have a positive which prompted him to see me about colonoscopy. 2. Chronic heartburn - R12, Heartburn indigestion at baseline for many years, controlled with over-the-counter Tums or Rolaids and dietary modification. Symptoms have intensified and changed over the last 6 months to a year to the point he is now taking omeprazole on a daily basis to prevent heartburn, indigestion, which is a substernal burning discomfort which generally has been occurring 2 or 3 times a week in the last 6 months to a year. No alarm symptoms reported. Never had a one-time screening EGD to rule out Shook's esophagus, early neoplasm, ulcer disease. 3. Positive colorectal cancer screening using Cologuard test - R19.5, Whether this is a true positive for cancer, true positive for old GI blood loss from colonic lesion noncancerous, or false positive remains be determined. Patient does require colonoscopy regardless. Treatment   1. Encounter for screening colonoscopy   Start PEG - 3350 * with electrolytes powder for reconsitution, 4000 ml, as directed, orally, as directed, 2 days, 1 gallon bottle, Refills 0  Start Metoclopramide Hydrochloride tablet, 10 mg, 1 tab(s), orally, 1 hr prior to each colon prep, 2 days, 2 tabs, Refills 0  IMAGING: Colonoscopy with MAC (Monitored Anesthesia Care) (Ordered for 07/17/2019)  Notes: 1. Colonoscopy is being scheduled July 17 9:00 in the morning [facility]. Low residue diet for 1 week prior to procedure. PEG 3350/Reglan prep. 2. Further plans depending on colonoscopy findings. 2. Chronic heartburn   IMAGING: EGD with MAC ([facility]) (Ordered for 07/17/2019)  Notes: 1. The patient is being advised to undergo a one time screening EGD to rule out Shook's esophagus as long as he isn't sedated for colonoscopy. I'm concerned by the change of acid reflux symptoms in the last 6-12 months compared to his 20-year baseline.  He is now requiring omeprazole on a regular basis to prevent heartburn, indigestion symptom recurrence multiple times a week. Although no alarm symptoms reported, he needs endoscopy to rule out upper GI neoplasm or Shook's esophagus. 3. Positive colorectal cancer screening using Cologuard test   Notes: 1. Colonoscopy as above.              No significant interval change in history or physical findings since last office vist

## 2019-07-18 ENCOUNTER — HOSPITAL ENCOUNTER (OUTPATIENT)
Dept: GENERAL RADIOLOGY | Age: 74
Discharge: HOME | End: 2019-07-18
Attending: FAMILY MEDICINE
Payer: MEDICARE

## 2019-07-18 DIAGNOSIS — J44.9 CHRONIC OBSTRUCTIVE PULMONARY DISEASE, UNSPECIFIED COPD TYPE (HCC): ICD-10-CM

## 2019-07-18 PROCEDURE — 71046 X-RAY EXAM CHEST 2 VIEWS: CPT

## 2019-08-13 ENCOUNTER — HOSPITAL ENCOUNTER (EMERGENCY)
Age: 74
Discharge: HOME | End: 2019-08-13
Attending: EMERGENCY MEDICINE
Payer: MEDICARE

## 2019-08-13 VITALS
SYSTOLIC BLOOD PRESSURE: 147 MMHG | WEIGHT: 182 LBS | RESPIRATION RATE: 14 BRPM | DIASTOLIC BLOOD PRESSURE: 83 MMHG | OXYGEN SATURATION: 96 % | BODY MASS INDEX: 26.88 KG/M2 | HEART RATE: 60 BPM | TEMPERATURE: 98.1 F

## 2019-08-13 DIAGNOSIS — T63.301A SPIDER BITE WOUND, ACCIDENTAL OR UNINTENTIONAL, INITIAL ENCOUNTER: Primary | ICD-10-CM

## 2019-08-13 PROCEDURE — 99283 EMERGENCY DEPT VISIT LOW MDM: CPT

## 2019-08-14 NOTE — DISCHARGE INSTRUCTIONS
Patient Education        Spider Bite or Scorpion Sting: Care Instructions  Your Care Instructions    Spider bites and scorpion stings often cause minor swelling, redness, pain, and itching. These mild symptoms are common and may last from a few hours to a few days. Some people have more severe reactions. Home treatment is often all that you need to relieve symptoms. Follow-up care is a key part of your treatment and safety. Be sure to make and go to all appointments, and call your doctor if you are having problems. It's also a good idea to know your test results and keep a list of the medicines you take. How can you care for yourself at home? · Put ice or a cold pack on the area for 10 to 20 minutes at a time. Put a thin cloth between the ice and your skin. · Try an over-the-counter medicine for itching, redness, swelling, and pain. Read and follow all instructions on the label. ? Take an antihistamine, such as diphenhydramine (Benadryl) or loratadine (Claritin). ? Put a hydrocortisone 1% cream or calamine lotion on the skin. · Don't scratch or rub the skin around the area. When should you call for help? Call 911 anytime you think you may need emergency care. For example, call if:    · You passed out (lost consciousness).     · You have a seizure.     · You have trouble breathing.    Call your doctor now or seek immediate medical care if:    · You have signs of infection, such as:  ? Increased pain, swelling, warmth, or redness around the bite or sting. ? Red streaks leading from the area. ? Pus draining from the area. ? A fever.     · You get a blister or sore at the bite or sting area, or the area turns purple.    Watch closely for changes in your health, and be sure to contact your doctor if:    · You have pain or burning at the area after 2 days of home treatment.     · You have symptoms for more than 1 week. Where can you learn more? Go to http://cesilia.info/.   Enter P595 in the search box to learn more about \"Spider Bite or Scorpion Sting: Care Instructions. \"  Current as of: September 23, 2018  Content Version: 12.1  © 4160-1135 [employer], [employer]. Care instructions adapted under license by [employer] (which disclaims liability or warranty for this information). If you have questions about a medical condition or this instruction, always ask your healthcare professional. [place] [address] any warranty or liability for your use of this information.

## 2019-08-14 NOTE — ED PROVIDER NOTES
Raquel Pacheco is a 69 y.o. Male with history of atrial fibrillation who states he was bit by spider on his left buttock just prior to arrival.  Patient is here in the area with head turned black. He brought the spider in with him. He denies any other recent illness. There is been no swelling or itching. Patient has not placed anything on the wound at all. The history is provided by the patient and medical records. Past Medical History:   Diagnosis Date    Arrhythmia     Atrial fibrillation ([address]     Chronic obstructive pulmonary disease (HCC)     Hypercholesteremia     Hypertension     Pacemaker     Sleep apnea     CPAP    Stroke [facility]) 2002    NO RESIDUAL       Past Surgical History:   Procedure Laterality Date    COLONOSCOPY N/A 7/17/2019    COLONOSCOPY performed by Bulmaro Jacobsen MD at [facility] ENDOSCOPY    HX APPENDECTOMY      HX PACEMAKER           History reviewed. No pertinent family history.     Social History     Socioeconomic History    Marital status:      Spouse name: Not on file    Number of children: Not on file    Years of education: Not on file    Highest education level: Not on file   Occupational History    Not on file   Social Needs    Financial resource strain: Not on file    Food insecurity:     Worry: Not on file     Inability: Not on file    Transportation needs:     Medical: Not on file     Non-medical: Not on file   Tobacco Use    Smoking status: Former Smoker    Smokeless tobacco: Never Used   Substance and Sexual Activity    Alcohol use: Yes     Comment: occasional    Drug use: No    Sexual activity: Not on file   Lifestyle    Physical activity:     Days per week: Not on file     Minutes per session: Not on file    Stress: Not on file   Relationships    Social connections:     Talks on phone: Not on file     Gets together: Not on file     Attends [employer] service: Not on file     Active member of club or organization: Not on file Attends meetings of clubs or organizations: Not on file     Relationship status: Not on file    Intimate partner violence:     Fear of current or ex partner: Not on file     Emotionally abused: Not on file     Physically abused: Not on file     Forced sexual activity: Not on file   Other Topics Concern    Not on file   Social History Narrative    Not on file         ALLERGIES: Latex, natural rubber; Advair diskus [fluticasone propion-salmeterol]; and Prednisone    Review of Systems   Constitutional: Negative for fever. HENT: Negative for trouble swallowing. Respiratory: Negative for shortness of breath. Cardiovascular: Negative for chest pain. Gastrointestinal: Negative for abdominal pain. Musculoskeletal: Negative for gait problem. Skin: Positive for rash. Hematological: Bruises/bleeds easily. Vitals:    08/13/19 2057   BP: 147/83   Pulse: 60   Resp: 14   Temp: 98.1 °F (36.7 °C)   SpO2: 96%   Weight: 82.6 kg (182 lb)            Physical Exam   Constitutional: He is oriented to person, place, and time. He appears well-developed and well-nourished. Non-toxic appearance. He does not appear ill. No distress. HENT:   Head: Normocephalic and atraumatic. Left Ear: External ear normal.   Eyes: Conjunctivae are normal.   Neck: Normal range of motion. Cardiovascular: Normal rate. Pulmonary/Chest: Effort normal.   Musculoskeletal: Normal range of motion. He exhibits no edema. Legs:  Neurological: He is alert and oriented to person, place, and time. Skin: Skin is warm and dry. He is not diaphoretic. Psychiatric: His behavior is normal.   Nursing note and vitals reviewed. [facility]       Procedures  Vitals:  Patient Vitals for the past 12 hrs:   Temp Pulse Resp BP SpO2   08/13/19 2057 98.1 °F (36.7 °C) 60 14 147/83 96 %         Medications ordered:   Medications - No data to display      Lab findings:  No results found for this or any previous visit (from the past 12 hour(s)).     EKG interpretation by ED Physician:      X-Ray, CT or other radiology findings or impressions:  No orders to display       Progress notes, Consult notes or additional Procedure notes: This pattern does not appear consistent with black  or brown recluse. Do not feel patient requires any antibiotics for the work-up at this time. Encourage patient to keep wound clean and apply calamine lotion twice daily    I have discussed with patient and/or family/sig other the results, interpretation of any imaging if performed, suspected diagnosis and treatment plan to include instructions regarding the diagnoses listed to which understanding was expressed with all questions answered      Reevaluation of patient:   stable    Disposition:  Diagnosis:   1. Spider bite wound, accidental or unintentional, initial encounter        Disposition: home    Follow-up Information     Follow up With Specialties Details Why Contact Info    Raoul Salter MD [facility]   [address] [facility] [address]  [phone]      [facility] EMERGENCY DEPT Emergency Medicine  If symptoms worsen [address] [phone]            Patient's Medications   Start Taking    No medications on file   Continue Taking    ALBUTEROL (VENTOLIN HFA) 90 MCG/ACTUATION INHALER    Take  by inhalation. APIXABAN (ELIQUIS) 5 MG TABLET    Take 1 Tab by mouth two (2) times a day. ATORVASTATIN (LIPITOR) 20 MG TABLET        CHOLECALCIFEROL (VITAMIN D3) 1,000 UNIT TABLET    Take  by mouth daily. CYANOCOBALAMIN (VITAMIN B12) 1,000 MCG/ML INJECTION        DILTIAZEM (TIAZAC) 360 MG SR CAPSULE    360 mg daily. FLOVENT  MCG/ACTUATION INHALER    as needed. HYDRALAZINE (APRESOLINE) 25 MG TABLET    two (2) times a day. METOPROLOL SUCCINATE (TOPROL-XL) 100 MG TABLET    100 mg two (2) times a day. OMEGA-3 FATTY ACIDS-VITAMIN E (FISH OIL) 1,000 MG CAP    Take 1 Cap by mouth.     OXYGEN-AIR DELIVERY SYSTEMS (HORIZON NASAL CPAP SYSTEM)    by Does Not Apply route.    These Medications have changed    No medications on file   Stop Taking    No medications on file

## 2019-08-14 NOTE — ED TRIAGE NOTES
Pt states he was working in the yard 45 minutes ago and was bitten by something on his right buttocks. Pt states it immediately turned black \"and its long.   Pt has a cup with a spider in it and a field guide to insects and spiders with him

## 2019-09-25 ENCOUNTER — CLINICAL SUPPORT (OUTPATIENT)
Dept: CARDIOLOGY CLINIC | Age: 74
End: 2019-09-25

## 2019-09-25 ENCOUNTER — OFFICE VISIT (OUTPATIENT)
Dept: CARDIOLOGY CLINIC | Age: 74
End: 2019-09-25

## 2019-09-25 VITALS
BODY MASS INDEX: 28.06 KG/M2 | HEART RATE: 60 BPM | WEIGHT: 190 LBS | SYSTOLIC BLOOD PRESSURE: 120 MMHG | DIASTOLIC BLOOD PRESSURE: 72 MMHG | OXYGEN SATURATION: 98 %

## 2019-09-25 DIAGNOSIS — I48.0 PAROXYSMAL A-FIB (HCC): ICD-10-CM

## 2019-09-25 DIAGNOSIS — I48.0 PAF (PAROXYSMAL ATRIAL FIBRILLATION) (HCC): Primary | ICD-10-CM

## 2019-09-25 DIAGNOSIS — Z95.0 PRESENCE OF PERMANENT CARDIAC PACEMAKER: ICD-10-CM

## 2019-09-25 DIAGNOSIS — I49.5 SICK SINUS SYNDROME (HCC): Primary | ICD-10-CM

## 2019-09-25 RX ORDER — OMEPRAZOLE 40 MG/1
CAPSULE, DELAYED RELEASE ORAL DAILY
COMMUNITY

## 2019-09-25 RX ORDER — TRAZODONE HYDROCHLORIDE 50 MG/1
50 TABLET ORAL
COMMUNITY

## 2019-09-25 NOTE — PROGRESS NOTES
1. Have you been to the ER, urgent care clinic since your last visit? Hospitalized since your last visit?no     2. Have you seen or consulted any other health care providers outside of the [address] [place] [address] since your last visit? Include any pap smears or colon screening.   No

## 2019-09-28 NOTE — PROGRESS NOTES
Subjective:      Annalisa St is in the office today for cardiac reevaluation. He has a history of hypertension, chronic atrial fibrillation,sick sinus syndrome with prior permanent pacemaker implantation and dyslipidemia. In regard to his atrial fibrillation, he has been maintained on Eliquis for stroke prevention and a combination of a BB and a CCB for rate control. He has had no recent symptoms of chest pain, dyspnea, orthopnea, PND or palpitations. In the office today, he reports he is doing \"all right\". Does a lot of yard work without limiting symptoms. He reports he is always busy. He has had no palpitations, near-syncope or syncope. He has no specific complaints in the office today. Patient Active Problem List    Diagnosis Date Noted    Positive colorectal cancer screening using Cologuard test 07/17/2019    Gastroesophageal reflux disease 07/17/2019    Paroxysmal [facility]-fib ([facility] 06/30/2017    Preoperative clearance 01/01/2017    Left-sided carotid artery disease ([facility] 01/01/2017    Sick sinus syndrome ([facility] 01/01/2017    Presence of permanent cardiac pacemaker 01/01/2017    History of venous disease 01/01/2017    Essential hypertension 01/01/2017    Dyslipidemia 01/01/2017    Tobacco abuse 01/01/2017    Family history of premature CAD 01/01/2017     Current Outpatient Medications   Medication Sig Dispense Refill    traZODone (DESYREL) 50 mg tablet trazodone 50 mg tablet      OMEPRAZOLE PO Take  by mouth.  cholecalciferol (VITAMIN D3) 1,000 unit tablet Take  by mouth daily.  apixaban (ELIQUIS) 5 mg tablet Take 1 Tab by mouth two (2) times a day. 180 Tab 3    hydrALAZINE (APRESOLINE) 25 mg tablet two (2) times a day.  cyanocobalamin (VITAMIN B12) 1,000 mcg/mL injection       omega-3 fatty acids-vitamin e (FISH OIL) 1,000 mg cap Take 1 Cap by mouth.  albuterol (VENTOLIN HFA) 90 mcg/actuation inhaler Take  by inhalation.       OXYGEN-AIR DELIVERY SYSTEMS (HORIZON NASAL CPAP SYSTEM) by Does Not Apply route.  atorvastatin (LIPITOR) 20 mg tablet       diltiazem (TIAZAC) 360 mg SR capsule 360 mg daily.  FLOVENT  mcg/actuation inhaler as needed.  metoprolol succinate (TOPROL-XL) 100 mg tablet 100 mg two (2) times a day. Allergies   Allergen Reactions    Latex, Natural Rubber Contact Dermatitis    Advair Diskus [Fluticasone Propion-Salmeterol] Nausea and Vomiting    Prednisone Hives     Past Medical History:   Diagnosis Date    Arrhythmia     Atrial fibrillation (HCC)     Chronic obstructive pulmonary disease (HCC)     Hypercholesteremia     Hypertension     Pacemaker     Sleep apnea     CPAP    Stroke [facility]) 2002    NO RESIDUAL     Past Surgical History:   Procedure Laterality Date    COLONOSCOPY N/A 7/17/2019    COLONOSCOPY performed by Deshawn Gaxiola MD at [facility] ENDOSCOPY    HX APPENDECTOMY      HX PACEMAKER       No family history on file. Social History     Tobacco Use   Smoking Status Former Smoker   Smokeless Tobacco Never Used          Review of Systems, additional:  Constitutional: negative  Eyes: negative  Respiratory: negative  Cardiovascular: negative  Gastrointestinal: negative  Musculoskeletal:negative  Neurological: negative  Behvioral/Psych: negative  Endocrine: negative  ENT: negative    Objective:     Visit Vitals  /72   Pulse 60   Wt 86.2 kg (190 lb)   SpO2 98%   BMI 28.06 kg/m²     General:  alert, cooperative, no distress   Chest Wall: inspection normal - no chest wall deformities or tenderness, respiratory effort normal   Lung: clear to auscultation bilaterally   Heart:  normal rate and regular rhythm, no gallops noted   Abdomen: soft, non-tender.  Bowel sounds normal. No masses,  no organomegaly   Extremities: extremities normal, atraumatic, no cyanosis or edema Skin: no rashes   Neuro: alert, oriented, normal speech, no focal findings or movement disorder noted         Assessment/Plan: ICD-10-CM ICD-9-CM    1. Essential hypertension, controlled in office today I10 401.9    2. Left-sided carotid artery disease ([facility] followed by vascular I77.9 447.9    3. Chronic a-fib ([facility] on Eliquis for anticoagulation, diltiazem and metoprolol for rate control. Stable. RT 6 mos  I48.0 427.31    4. Sick sinus syndrome ([facility] S/P PPM. I49.5 427.81    5. Dyslipidemia, followed by PCP E78.5 272.4    6. Family history of premature CAD Z82.49 V17.3    7. History of venous disease, S/P vein stripping Z86.79 V12.59    8. Presence of permanent cardiac pacemaker, initial implant 2007, generator replacement 2014 all done at [facility].  Z95.0 V45.01    9.  Tobacco abuse Z72.0 305.1

## 2019-11-15 ENCOUNTER — ANESTHESIA EVENT (OUTPATIENT)
Dept: ENDOSCOPY | Age: 74
End: 2019-11-15
Payer: MEDICARE

## 2019-11-15 NOTE — PERIOP NOTES
PAT - SURGICAL PRE-ADMISSION INSTRUCTIONS    NAME:  Lobo Ritter                                                          TODAY'S DATE:  11/15/2019    SURGERY DATE:  11/18/2019                                  SURGERY ARRIVAL TIME:   0630    1. Do NOT eat or drink anything, including candy or gum, after MIDNIGHT on 11/17/19 , unless you have specific instructions from your Surgeon or Anesthesia Provider to do so. 2. No smoking on the day of surgery. 3. No alcohol 24 hours prior to the day of surgery. 4. No recreational drugs for one week prior to the day of surgery. 5. Leave all valuables, including money/purse, at home. 6. Remove all jewelry, nail polish, makeup (including mascara); no lotions, powders, deodorant, or perfume/cologne/after shave. 7. Glasses/Contact lenses and Dentures may be worn to the hospital.  They will be removed prior to surgery. 8. Call your doctor if symptoms of a cold or illness develop within 24 ours prior to surgery. 9. AN ADULT MUST DRIVE YOU HOME AFTER OUTPATIENT SURGERY. 10. If you are having an OUTPATIENT procedure, please make arrangements for a responsible adult to be with you for 24 hours after your surgery. 11. If you are admitted to the hospital, you will be assigned to a bed after surgery is complete. Normally a family member will not be able to see you until you are in your assigned bed. 15. Family is encouraged to accompany you to the hospital.  We ask visitors in the treatment area to be limited to ONE person at a time to ensure patient privacy. EXCEPTIONS WILL BE MADE AS NEEDED. 15. Children under 12 are discouraged from entering the treatment area and need to be supervised by an adult when in the waiting room. Special Instructions:     Take these medications the morning of surgery with a sip of water:  DILTIAZEM, METOPROLOL, HYDRALAZINE, STOP anticoagulants AT LEAST 1 WEEK PRIOR to your surgery or, follow other MD instructions:  PRESTON Gonzales Prep:    shower with anti-bacterial soap    These surgical instructions were reviewed with PATIENT'S WIFE during the PAT PHONE CALL. Directions: On the morning of surgery, please go to the [address] [facility]. Enter the building from the [facility] entrance, 1st floor (next to the Emergency Room entrance). Take the elevator to the 2nd floor. Sign in at the Registration Desk.     If you have any questions and/or concerns, please do not hesitate to call:  (Prior to the day of surgery)  [facility] unit:  [phone]  (Day of surgery)  [facility] unit:  [phone]

## 2019-11-18 ENCOUNTER — HOSPITAL ENCOUNTER (OUTPATIENT)
Age: 74
Setting detail: OUTPATIENT SURGERY
Discharge: HOME | End: 2019-11-18
Attending: INTERNAL MEDICINE | Admitting: INTERNAL MEDICINE
Payer: MEDICARE

## 2019-11-18 ENCOUNTER — ANESTHESIA (OUTPATIENT)
Dept: ENDOSCOPY | Age: 74
End: 2019-11-18
Payer: MEDICARE

## 2019-11-18 VITALS
TEMPERATURE: 97.1 F | WEIGHT: 191 LBS | HEART RATE: 65 BPM | BODY MASS INDEX: 28.29 KG/M2 | SYSTOLIC BLOOD PRESSURE: 115 MMHG | RESPIRATION RATE: 18 BRPM | HEIGHT: 69 IN | OXYGEN SATURATION: 95 % | DIASTOLIC BLOOD PRESSURE: 68 MMHG

## 2019-11-18 PROCEDURE — 76060000031 HC ANESTHESIA FIRST 0.5 HR: Performed by: INTERNAL MEDICINE

## 2019-11-18 PROCEDURE — 76040000019: Performed by: INTERNAL MEDICINE

## 2019-11-18 PROCEDURE — 74011250636 HC RX REV CODE- 250/636: Performed by: NURSE ANESTHETIST, CERTIFIED REGISTERED

## 2019-11-18 PROCEDURE — 74011000250 HC RX REV CODE- 250: Performed by: NURSE ANESTHETIST, CERTIFIED REGISTERED

## 2019-11-18 PROCEDURE — 88305 TISSUE EXAM BY PATHOLOGIST: CPT

## 2019-11-18 PROCEDURE — 77030037186 HC VLV ENDOSC STRL DEFENDO DISP MVAT -A: Performed by: INTERNAL MEDICINE

## 2019-11-18 RX ORDER — LIDOCAINE HYDROCHLORIDE 10 MG/ML
0.1 INJECTION, SOLUTION EPIDURAL; INFILTRATION; INTRACAUDAL; PERINEURAL AS NEEDED
Status: DISCONTINUED | OUTPATIENT
Start: 2019-11-18 | End: 2019-11-18 | Stop reason: HOSPADM

## 2019-11-18 RX ORDER — FAMOTIDINE 20 MG/1
20 TABLET, FILM COATED ORAL ONCE
Status: DISCONTINUED | OUTPATIENT
Start: 2019-11-18 | End: 2019-11-18 | Stop reason: HOSPADM

## 2019-11-18 RX ORDER — LIDOCAINE HYDROCHLORIDE 20 MG/ML
INJECTION, SOLUTION EPIDURAL; INFILTRATION; INTRACAUDAL; PERINEURAL AS NEEDED
Status: DISCONTINUED | OUTPATIENT
Start: 2019-11-18 | End: 2019-11-18 | Stop reason: HOSPADM

## 2019-11-18 RX ORDER — PROPOFOL 10 MG/ML
INJECTION, EMULSION INTRAVENOUS AS NEEDED
Status: DISCONTINUED | OUTPATIENT
Start: 2019-11-18 | End: 2019-11-18 | Stop reason: HOSPADM

## 2019-11-18 RX ORDER — SODIUM CHLORIDE 9 MG/ML
25 INJECTION, SOLUTION INTRAVENOUS CONTINUOUS
Status: CANCELLED | OUTPATIENT
Start: 2019-11-18 | End: 2019-11-18

## 2019-11-18 RX ORDER — DEXTROMETHORPHAN/PSEUDOEPHED 2.5-7.5/.8
1.2 DROPS ORAL
Status: CANCELLED | OUTPATIENT
Start: 2019-11-18

## 2019-11-18 RX ORDER — SODIUM CHLORIDE 0.9 % (FLUSH) 0.9 %
5-40 SYRINGE (ML) INJECTION EVERY 8 HOURS
Status: CANCELLED | OUTPATIENT
Start: 2019-11-18

## 2019-11-18 RX ORDER — SODIUM CHLORIDE, SODIUM LACTATE, POTASSIUM CHLORIDE, CALCIUM CHLORIDE 600; 310; 30; 20 MG/100ML; MG/100ML; MG/100ML; MG/100ML
25 INJECTION, SOLUTION INTRAVENOUS CONTINUOUS
Status: DISCONTINUED | OUTPATIENT
Start: 2019-11-18 | End: 2019-11-18 | Stop reason: HOSPADM

## 2019-11-18 RX ORDER — SODIUM CHLORIDE 0.9 % (FLUSH) 0.9 %
5-40 SYRINGE (ML) INJECTION AS NEEDED
Status: CANCELLED | OUTPATIENT
Start: 2019-11-18

## 2019-11-18 RX ADMIN — LIDOCAINE HYDROCHLORIDE 20 MG: 20 INJECTION, SOLUTION INTRAVENOUS at 08:06

## 2019-11-18 RX ADMIN — SODIUM CHLORIDE, SODIUM LACTATE, POTASSIUM CHLORIDE, AND CALCIUM CHLORIDE 25 ML/HR: 600; 310; 30; 20 INJECTION, SOLUTION INTRAVENOUS at 06:51

## 2019-11-18 RX ADMIN — PROPOFOL 20 MG: 10 INJECTION, EMULSION INTRAVENOUS at 08:10

## 2019-11-18 RX ADMIN — PROPOFOL 100 MG: 10 INJECTION, EMULSION INTRAVENOUS at 08:06

## 2019-11-18 RX ADMIN — PROPOFOL 20 MG: 10 INJECTION, EMULSION INTRAVENOUS at 08:08

## 2019-11-18 NOTE — H&P
Reason for Appointment   1. NP O/V + Cologuard   2. Screening colonoscopy     History of Present Illness   encounter:   5/22/19: Mr. Heath Martin is a 69year-old male who is here for colon cancer screening. He has not had a colonoscopy in a long time. He did have a Cologuard test performed recently that was positive. He reports having what sounds like FIOBT through the [address] on the same bowel movement specimen, and this was negative. He denies any rectal bleeding, changes in bowel habits or abdominal pain. Patient does have a history of chronic heartburn. He does not take NSAIDs on a regular basis. He believes he had a sigmoidoscopy in 2007 through the [facility] but doesn't remember the circumstances under which that was performed. He does have indigestion, heartburn only with trigger foods at baseline. He has been treating symptoms with dietary changes and occasional Tums or Rolaids. In the last 6 months to a year; however, the symptoms have worsened to the point he started taking Prilosec on a daily basis in order to avoid symptoms of heartburn, indigestion at least 2 or 3 times a week. He has no trouble swallowing. He has never had a one time screening EGD to rule out Shook's esophagus. He denies chronic recent abdominal pain, constipation. He does have occasional diarrhea often times associated with lactose ingestion. Current Medications   Taking    Fish Oil 1000 mg capsule 1 cap(s) orally 2 times a day    Taztia  mg/24 hours capsule, extended release 1 cap(s) orally once a day    Eliquis 5 mg tablet tab(s) orally BID    Vitamin D 1000 IU cap 1 cap po qd    hydralazine 25 mg tablet 1 tab(s) orally 4 times a day    Metoprolol Succinate  mg tablet, extended release 1 tab(s) orally once a day    atorvastatin 20 mg tablet 1 tab(s) orally once a day    Vitamin B 12 250 mg 1 tab qd    Medication List reviewed and reconciled with the patient      Past Medical History   Stroke. Hypertension. Surgical History   Appendectomy    Pacemaker    Vein stripping    Ear surgery - Skin cancer      Family History   Father:    Mother:    No known family history of any chronic GI illness including no esophageal, gastric or colon cancer. Social History   Occupation: Retired. no Drugs (Illicit). Smoking   Tobacco use: never smoker  no Alcohol. no Blood transfusions. Marital Status: . Allergies   Prevacid: hives - Allergy - Onset Date 2019   Latex: hives - Onset Date 2019     Review of Systems   Complete review of systems was taken and reviewed with the patient on and will be scanned into the medical record. Positives will be noted in HPI. Negatives will not be listed here. Vital Signs   BMI 27.91, HR 49, /66, Wt 189, Ht 5'9\", RR 16.     Examination   General examination:  LABORATORY DATA: Lab work May 2019: WBC 7.2, hemoglobin 15.7 hematocrit 47.8, MCV 91, platelets 499. A BMP normal except for GFR estimated low 56. Triglycerides 89, LDL 74. Hepatic function profile normal including a total protein is 7.5, albumin of 4.5, total bilirubin 0.6, alkaline phosphatase is of 80, AST 23, ALT 22. TSH 3.180, hepatitis see antibody negative. Samina Lam Physical Examination   General: Pleasant, healthy-appearing male, walking with a cane in no obvious distress. Neck: Supple. No thyromegaly or mass palpable. Nodes: No supraclavicular, axillary, cervical nodes palpable. Chest: Clear to auscultation symmetric breath sounds. Good air movement. Heart: First and second heart sounds are normal. No murmurs, rubs, gallops. Abdomen: Soft, nontender. No organomegaly or mass palpable. Bowel sounds are normal and there no bruits. Extremities: No pedal edema. Assessments     1. Encounter for screening colonoscopy - Z12.11 (Primary), Average risk by personal and family history. The patient is behind on having routine colonoscopy exams.  He did have a positive which prompted him to see me about colonoscopy. 2. Chronic heartburn - R12, Heartburn indigestion at baseline for many years, controlled with over-the-counter Tums or Rolaids and dietary modification. Symptoms have intensified and changed over the last 6 months to a year to the point he is now taking omeprazole on a daily basis to prevent heartburn, indigestion, which is a substernal burning discomfort which generally has been occurring 2 or 3 times a week in the last 6 months to a year. No alarm symptoms reported. Never had a one-time screening EGD to rule out Shook's esophagus, early neoplasm, ulcer disease. 3. Positive colorectal cancer screening using Cologuard test - R19.5, Whether this is a true positive for cancer, true positive for old GI blood loss from colonic lesion noncancerous, or false positive remains be determined. Patient does require colonoscopy regardless. Treatment   1. Encounter for screening colonoscopy   Start PEG - 3350 * with electrolytes powder for reconsitution, 4000 ml, as directed, orally, as directed, 2 days, 1 gallon bottle, Refills 0  Start Metoclopramide Hydrochloride tablet, 10 mg, 1 tab(s), orally, 1 hr prior to each colon prep, 2 days, 2 tabs, Refills 0  IMAGING: Colonoscopy with MAC ([facility]) (Ordered for 07/17/2019)  Notes: 1. Colonoscopy is being scheduled July 17 9:00 in the morning [facility]. Low residue diet for 1 week prior to procedure. PEG 3350/Reglan prep. 2. Further plans depending on colonoscopy findings. 2. Chronic heartburn   IMAGING: EGD with MAC ([facility]) (Ordered for 07/17/2019)  Notes: 1. The patient is being advised to undergo a one time screening EGD to rule out Shook's esophagus as long as he isn't sedated for colonoscopy. I'm concerned by the change of acid reflux symptoms in the last 6-12 months compared to his 20-year baseline.  He is now requiring omeprazole on a regular basis to prevent heartburn, indigestion symptom recurrence multiple times a week. Although no alarm symptoms reported, he needs endoscopy to rule out upper GI neoplasm or Shook's esophagus. 3. Positive colorectal cancer screening using Cologuard test   Notes: 1. Colonoscopy as above.

## 2019-11-18 NOTE — INTERVAL H&P NOTE
H&P Update: 
Karma Giron was seen and examined. History and physical has been reviewed. The patient has been examined. There have been no significant clinical changes since the completion of the originally dated History and Physical.  EGD in July showed severe ulcerative esophagitis. He was placed on PPI and plans were to repeat EGD in 3-4 months to document healing and r/o Shook's and neoplasm

## 2019-11-18 NOTE — ANESTHESIA PREPROCEDURE EVALUATION
Relevant Problems   No relevant active problems       Anesthetic History               Review of Systems / Medical History  Patient summary reviewed and pertinent labs reviewed    Pulmonary    COPD: moderate    Sleep apnea: CPAP           Neuro/Psych         TIA    Comments: Left carotid occluded per pt Cardiovascular    Hypertension: well controlled        Dysrhythmias : atrial fibrillation  Pacemaker and hyperlipidemia    Exercise tolerance: >4 METS     GI/Hepatic/Renal     GERD: well controlled           Endo/Other  Within defined limits           Other Findings            Physical Exam    Airway  Mallampati: III  TM Distance: 4 - 6 cm  Neck ROM: decreased range of motion   Mouth opening: Normal     Cardiovascular    Rhythm: regular           Dental    Dentition: Poor dentition     Pulmonary  Breath sounds clear to auscultation               Abdominal  GI exam deferred       Other Findings            Anesthetic Plan    ASA: 3  Anesthesia type: MAC            Anesthetic plan and risks discussed with: Patient

## 2019-11-18 NOTE — ANESTHESIA POSTPROCEDURE EVALUATION
Procedure(s):  ESOPHAGOGASTRODUODENOSCOPY (EGD).     MAC    Anesthesia Post Evaluation        Patient location during evaluation: PACU  Patient participation: complete - patient participated  Level of consciousness: awake and alert  Pain score: 0  Airway patency: patent  Anesthetic complications: no  Cardiovascular status: stable  Respiratory status: room air  Hydration status: stable  Post anesthesia nausea and vomiting:  none      Vitals Value Taken Time   /68 11/18/2019  8:22 AM   Temp     Pulse 65 11/18/2019  8:22 AM   Resp 18 11/18/2019  8:22 AM   SpO2 95 % 11/18/2019  8:22 AM

## 2019-11-18 NOTE — DISCHARGE INSTRUCTIONS
RECOMMENDATIONS:  1. Continue Omeprazole daily at the current dose  2. Call me in 3 weeks for biopsy results and a recommendation as to whether a surveillance EGD program is needed. If it is b/o Shook's esophagus then I would  Recommend that he see me in the office annually    DISCHARGE SUMMARY from Nurse    PATIENT INSTRUCTIONS:    After general anesthesia or intravenous sedation, for 24 hours or while taking prescription Narcotics:  · Limit your activities  · Do not drive and operate hazardous machinery  · Do not make important personal or business decisions  · Do  not drink alcoholic beverages  · If you have not urinated within 8 hours after discharge, please contact your surgeon on call. Report the following to your surgeon:  · Excessive pain, swelling, redness or odor of or around the surgical area  · Temperature over 100.5  · Nausea and vomiting lasting longer than 4 hours or if unable to take medications  · Any signs of decreased circulation or nerve impairment to extremity: change in color, persistent  numbness, tingling, coldness or increase pain  · Any questions    These are general instructions for a healthy lifestyle:    No smoking/ No tobacco products/ Avoid exposure to second hand smoke  Surgeon General's Warning:  Quitting smoking now greatly reduces serious risk to your health. Obesity, smoking, and sedentary lifestyle greatly increases your risk for illness    A healthy diet, regular physical exercise & weight monitoring are important for maintaining a healthy lifestyle    You may be retaining fluid if you have a history of heart failure or if you experience any of the following symptoms:  Weight gain of 3 pounds or more overnight or 5 pounds in a week, increased swelling in our hands or feet or shortness of breath while lying flat in bed. Please call your doctor as soon as you notice any of these symptoms; do not wait until your next office visit.         The discharge information has been reviewed with the patient. The patient verbalized understanding. Discharge medications reviewed with the patient and appropriate educational materials and side effects teaching were provided. ___________________________________________________________________________________________________________________________________  Patient armband removed and given to patient to take home.   Patient was informed of the privacy risks if armband lost or stolen

## 2019-11-18 NOTE — PERIOP NOTES
Phase 2 Endo-Recovery Summary  Report received from [facility], RN    Vitals:    11/15/19 1430 11/18/19 0636 11/18/19 0822   BP:  (!) 156/95 115/68   Pulse:  69 65   Resp:  18 18   Temp:  97.1 °F (36.2 °C)    SpO2:  97% 95%   Weight: 86.6 kg (191 lb) 86.6 kg (191 lb)    Height: 5' 9\" (1.753 m) 5' 9\" (1.753 m)        oriented to time, place, person and situation    Patient up in chair and wife at the bedside. Lines and Drains  Peripheral Intravenous Line: removed at discharge. Peripheral IV 11/18/19 Right Hand (Active)   Site Assessment Clean, dry, & intact 11/18/2019  8:21 AM   Phlebitis Assessment 0 11/18/2019  8:21 AM   Infiltration Assessment 0 11/18/2019  8:21 AM   Dressing Status Clean, dry, & intact 11/18/2019  8:21 AM   Dressing Type Tape;Transparent 11/18/2019  8:21 AM   Hub Color/Line Status Pink; Infusing 11/18/2019  8:21 AM   Alcohol Cap Used Yes 11/18/2019  8:21 AM         Doctor came in to speak to patient and family. Patient assisted to chair with minimal assist. Pateint tolerating liquids well. Patient's family at bedside. Discharge discussed with patient and family. No questions or concerns at this time. Patient had time to ask any questions. Patient discharged to home , with wife.       Tahir Cooper RN

## 2019-11-18 NOTE — PROCEDURES
Endoscopy Procedure Note    Patient: Rose Marie Born MRN: [id number]  SSN: [id number]    YOB: 1945  Age: 69 y.o. Sex: male      Date/Time:  11/18/2019 8:14 AM    Esophagogastroduodenoscopy (EGD) Procedure Report    Procedure: Esophagogastroduodenoscopy with biopsy    IMPRESSION:   1. Healed distal esophageal ulcers  2. Irregular Z-line in the distal esophagus----10 and 3 0'clock positions with gastric mucosa extending updward 5-10 mm. Bx'd with the   assistance of NBI to confirm goblet cell metaplasia and r/o dysplasia  3.  5 cm hiatal hernia--diaphragm at 43 cm and distal end of the tubular esophagus 38 cm  4. Normal mucosa of the stomach, duodenal bulb and second portion     RECOMMENDATIONS:  1. Continue Omeprazole daily at the current dose  2. Call me in 3 weeks for biopsy results and a recommendation as to whether a surveillance EGD program is needed. If it is b/o Shook's esophagus then I would  Recommend that he see me in the office annually    Indication: severe ulcerative esophagitis on EGD in July. Repeat EGD to confirm healing and r/o Shook's esophagus, neoplasm  :  Fabien Rolon MD  Referring Provider:   Hood Hughes MD  Assistants: Endoscopy Technician-1: Constantino Denise CNA  Endoscopy RN-1: Ayana Correa RN  History: The history and physical exam were reviewed and updated. Endoscope: [id number]  ASA: ASA 2 - Patient with mild systemic disease with no functional limitations  Mallampati: II (soft palate, uvula, fauces visible)  Sedation:  MAC anesthesia    Description of the procedure: The procedure was discussed with the patient including risks, benefits, alternatives including risks of iv sedation, bleeding, perforation and aspiration. A safety timeout was performed. The patient was placed in the left lateral decubitus position. A bite block was placed.   The patient was given incremental doses of intravenous medication until moderate sedation was achieved. The patients vital signs were monitored at all times including heart rate/rhythm, blood pressure and oxygen saturation. The endoscope was then passed under direct visualization into the esophagus, across the GE junction into the stomach and through the pylorus into the duodenal bulb and second portion. The endoscope was then slowly withdrawn while visualizing the mucosa. In the stomach a retroflexion was performed and gastric fundus and cardia visualized. .The endoscope was then slowly withdrawn. The patient was then transferred to recovery in stable condition. Findings: see impression    Complications:   none    EBL: minimal    Discharge disposition:  Home in the company of  when able to ambulate    Francy Calvinismael.  MD Sid, [facility], [address] [employer]  November 18, 2019  8:14 AM

## 2020-01-31 ENCOUNTER — HOSPITAL ENCOUNTER (OUTPATIENT)
Dept: ULTRASOUND IMAGING | Age: 75
Discharge: HOME | End: 2020-01-31
Attending: FAMILY MEDICINE
Payer: MEDICARE

## 2020-01-31 DIAGNOSIS — G47.33 OBSTRUCTIVE SLEEP APNEA (ADULT) (PEDIATRIC): ICD-10-CM

## 2020-01-31 DIAGNOSIS — N18.9 CHRONIC KIDNEY DISEASE, UNSPECIFIED: ICD-10-CM

## 2020-01-31 DIAGNOSIS — I83.93 VARICOSE VEINS OF LEGS: ICD-10-CM

## 2020-01-31 DIAGNOSIS — I10 ESSENTIAL HYPERTENSION, MALIGNANT: ICD-10-CM

## 2020-01-31 DIAGNOSIS — I87.2 PERIPHERAL VENOUS INSUFFICIENCY: ICD-10-CM

## 2020-01-31 DIAGNOSIS — R79.89 HYPOURICEMIA: ICD-10-CM

## 2020-01-31 DIAGNOSIS — I48.91 ATRIAL FIBRILLATION (HCC): ICD-10-CM

## 2020-01-31 DIAGNOSIS — I25.10 CORONARY ATHEROSCLEROSIS OF NATIVE CORONARY ARTERY: ICD-10-CM

## 2020-01-31 DIAGNOSIS — I49.8 NODAL RHYTHM DISORDER: ICD-10-CM

## 2020-01-31 DIAGNOSIS — L30.9 ACUTE DERMATITIS: ICD-10-CM

## 2020-01-31 DIAGNOSIS — E55.9 AVITAMINOSIS D: ICD-10-CM

## 2020-01-31 DIAGNOSIS — Z79.01 CHRONIC ANTICOAGULATION: ICD-10-CM

## 2020-01-31 PROCEDURE — 76536 US EXAM OF HEAD AND NECK: CPT

## 2020-07-29 ENCOUNTER — OFFICE VISIT (OUTPATIENT)
Dept: CARDIOLOGY CLINIC | Age: 75
End: 2020-07-29

## 2020-07-29 DIAGNOSIS — I49.5 SICK SINUS SYNDROME (HCC): Primary | ICD-10-CM

## 2020-07-29 DIAGNOSIS — I48.0 PAF (PAROXYSMAL ATRIAL FIBRILLATION) (HCC): ICD-10-CM

## 2020-07-29 DIAGNOSIS — Z95.0 PRESENCE OF PERMANENT CARDIAC PACEMAKER: ICD-10-CM

## 2020-08-03 NOTE — PROGRESS NOTES
I have personally seen and evaluated the device findings. Interrogation reviewed and I agree with assessment.     Dudley Montoya

## 2020-11-17 ENCOUNTER — CLINICAL SUPPORT (OUTPATIENT)
Dept: CARDIOLOGY CLINIC | Age: 75
End: 2020-11-17
Payer: MEDICARE

## 2020-11-17 DIAGNOSIS — Z95.0 PRESENCE OF PERMANENT CARDIAC PACEMAKER: ICD-10-CM

## 2020-11-17 DIAGNOSIS — I49.5 SICK SINUS SYNDROME (HCC): Primary | ICD-10-CM

## 2020-11-17 PROCEDURE — 93279 PRGRMG DEV EVAL PM/LDLS PM: CPT | Performed by: INTERNAL MEDICINE

## 2020-12-14 ENCOUNTER — OFFICE VISIT (OUTPATIENT)
Dept: CARDIOLOGY CLINIC | Age: 75
End: 2020-12-14
Payer: MEDICARE

## 2020-12-14 VITALS
SYSTOLIC BLOOD PRESSURE: 143 MMHG | DIASTOLIC BLOOD PRESSURE: 73 MMHG | TEMPERATURE: 97.7 F | BODY MASS INDEX: 28.5 KG/M2 | OXYGEN SATURATION: 98 % | HEIGHT: 69 IN | WEIGHT: 192.4 LBS | RESPIRATION RATE: 16 BRPM | HEART RATE: 72 BPM

## 2020-12-14 DIAGNOSIS — I49.5 SICK SINUS SYNDROME (HCC): Primary | ICD-10-CM

## 2020-12-14 PROCEDURE — 1101F PT FALLS ASSESS-DOCD LE1/YR: CPT | Performed by: INTERNAL MEDICINE

## 2020-12-14 PROCEDURE — G8754 DIAS BP LESS 90: HCPCS | Performed by: INTERNAL MEDICINE

## 2020-12-14 PROCEDURE — G8753 SYS BP > OR = 140: HCPCS | Performed by: INTERNAL MEDICINE

## 2020-12-14 PROCEDURE — G8427 DOCREV CUR MEDS BY ELIG CLIN: HCPCS | Performed by: INTERNAL MEDICINE

## 2020-12-14 PROCEDURE — G8432 DEP SCR NOT DOC, RNG: HCPCS | Performed by: INTERNAL MEDICINE

## 2020-12-14 PROCEDURE — 3017F COLORECTAL CA SCREEN DOC REV: CPT | Performed by: INTERNAL MEDICINE

## 2020-12-14 PROCEDURE — G8419 CALC BMI OUT NRM PARAM NOF/U: HCPCS | Performed by: INTERNAL MEDICINE

## 2020-12-14 PROCEDURE — 99214 OFFICE O/P EST MOD 30 MIN: CPT | Performed by: INTERNAL MEDICINE

## 2020-12-14 PROCEDURE — G8536 NO DOC ELDER MAL SCRN: HCPCS | Performed by: INTERNAL MEDICINE

## 2020-12-14 NOTE — PROGRESS NOTES
Zeeshan Cornejo presents today for   Chief Complaint   Patient presents with   [facility]     Chest Pain       Zeeshan Cornejo preferred language for health care discussion is english/other. Personal Protective Equipment:   Personal Protective Equipment was used including: mask-surgical and hands-gloves. Patient was placed on no precaution(s). Patient was masked. Is someone accompanying this pt? Yes; Spouse Mrs. Philippe Link    Is the patient using any DME equipment during OV? No    Depression Screening:  3 most recent PHQ Screens 9/25/2019   Little interest or pleasure in doing things Not at all   Feeling down, depressed, irritable, or hopeless Not at all   Total Score PHQ 2 0       Learning Assessment:  Learning Assessment 12/29/2016   PRIMARY LEARNER Patient   PRIMARY LANGUAGE ENGLISH   LEARNER PREFERENCE PRIMARY DEMONSTRATION   ANSWERED BY pat   RELATIONSHIP SELF       Abuse Screening:  No flowsheet data found. Fall Risk  Fall Risk Assessment, last 12 mths 9/25/2019   Able to walk? Yes   Fall in past 12 months? No       Pt currently taking Anticoagulant therapy? No    Coordination of Care:  1. Have you been to the ER, urgent care clinic since your last visit? Hospitalized since your last visit? No    2. Have you seen or consulted any other health care providers outside of the [address] [place] [address] since your last visit? Include any pap smears or colon screening.  No

## 2020-12-17 NOTE — PROGRESS NOTES
Subjective:      Trey Thompson is in the office today for cardiac reevaluation. He has a history of hypertension, chronic atrial fibrillation,sick sinus syndrome with prior permanent pacemaker implantation and dyslipidemia. In regard to his atrial fibrillation, he has been maintained on Eliquis for stroke prevention and a combination of a BB and a CCB for rate control. He has had no recent symptoms of chest pain, dyspnea, orthopnea, PND or palpitations. In the office today, he reports that he has been troubled recently by hearing voices in his head. There are 2 voices 1 of which is fairly benign. The other voice keeps him up at night. He is going to see neurology sometime here in the near future. Patient Active Problem List    Diagnosis Date Noted    Positive colorectal cancer screening using Cologuard test 07/17/2019    Gastroesophageal reflux disease 07/17/2019    Paroxysmal A-fib ([facility] 06/30/2017    Preoperative clearance 01/01/2017    Left-sided carotid artery disease ([facility] 01/01/2017    Sick sinus syndrome ([facility] 01/01/2017    Presence of permanent cardiac pacemaker 01/01/2017    History of venous disease 01/01/2017    Essential hypertension 01/01/2017    Dyslipidemia 01/01/2017    Tobacco abuse 01/01/2017    Family history of premature CAD 01/01/2017     Current Outpatient Medications   Medication Sig Dispense Refill    traZODone (DESYREL) 50 mg tablet 50 mg nightly.  OMEPRAZOLE PO Take  by mouth daily.  cholecalciferol (VITAMIN D3) 1,000 unit tablet Take  by mouth daily.  apixaban (ELIQUIS) 5 mg tablet Take 1 Tab by mouth two (2) times a day. 180 Tab 3    hydrALAZINE (APRESOLINE) 25 mg tablet two (2) times a day.  cyanocobalamin (VITAMIN B12) 1,000 mcg/mL injection       omega-3 fatty acids-vitamin e (FISH OIL) 1,000 mg cap Take 1 Cap by mouth.  albuterol (VENTOLIN HFA) 90 mcg/actuation inhaler Take  by inhalation.       OXYGEN-AIR DELIVERY SYSTEMS (HORIZON NASAL CPAP SYSTEM) by Does Not Apply route.  atorvastatin (LIPITOR) 20 mg tablet       diltiazem (TIAZAC) 360 mg SR capsule 360 mg daily.  FLOVENT  mcg/actuation inhaler as needed.  metoprolol succinate (TOPROL-XL) 100 mg tablet 100 mg two (2) times a day. Allergies   Allergen Reactions    Latex, Natural Rubber Contact Dermatitis    Advair Diskus [Fluticasone Propion-Salmeterol] Nausea and Vomiting    Prednisone Hives     Past Medical History:   Diagnosis Date    Arrhythmia     Atrial fibrillation (HCC)     Chronic obstructive pulmonary disease (HCC)     GERD (gastroesophageal reflux disease)     Hypercholesteremia     Hypertension     Pacemaker     Sleep apnea     CPAP    Stroke [facility]) 2002    NO RESIDUAL     Past Surgical History:   Procedure Laterality Date    COLONOSCOPY N/A 7/17/2019    COLON[facility]COPY [facility] Gloria Elizabeth [facility] at [facility] ENDOSCOPY    HX APPENDECTOMY      HX PACEMAKER       No family history on file. Social History     Tobacco Use   Smoking Status Former Smoker   Smokeless Tobacco Never Used          Review of Systems, additional:  Constitutional: negative  Eyes: negative  Respiratory: negative  Cardiovascular: negative  Gastrointestinal: negative  Musculoskeletal:negative  Neurological: negative  Behvioral/Psych: negative  Endocrine: negative  ENT: negative    Objective:     Visit Vitals  BP (!) 143/73 (BP 1 Location: Right arm, BP Patient Position: Sitting)   Pulse 72   Temp 97.7 °F (36.5 °C) (Temporal)   Resp 16   Ht 5' 9\" (1.753 m)   Wt 192 lb 6.4 oz (87.3 kg)   SpO2 98%   BMI 28.41 kg/m²     General:  alert, cooperative, no distress   Chest Wall: inspection normal - no chest wall deformities or tenderness, respiratory effort normal   Lung: clear to auscultation bilaterally   Heart:  normal rate and regular rhythm, no gallops noted   Abdomen: soft, non-tender.  Bowel sounds normal. No masses,  no organomegaly   Extremities: extremities normal, atraumatic, no cyanosis or edema Skin: no rashes   Neuro: alert, oriented, normal speech, no focal findings or movement disorder noted         Assessment/Plan:       ICD-10-CM ICD-9-CM    1. Essential hypertension, controlled in office today I10 401.9    2. Left-sided carotid artery disease ([facility] followed by vascular I77.9 447.9    3. Chronic a-fib ([facility] on Eliquis for anticoagulation, diltiazem and metoprolol for rate control. Stable. RT 6 mos  I48.0 427.31    4. Sick sinus syndrome ([facility] S/P PPM. I49.5 427.81    5. Dyslipidemia, followed by PCP E78.5 272.4    6. Family history of premature CAD Z82.49 V17.3    7. History of venous disease, S/P vein stripping Z86.79 V12.59    8. Presence of permanent cardiac pacemaker, initial implant 2007, generator replacement 2014 all done at [facility].  Z95.0 V45.01    9.  Tobacco abuse Z72.0 305.1    10     Auditory hallucinations, has appointment with neurology

## 2021-06-25 ENCOUNTER — OFFICE VISIT (OUTPATIENT)
Dept: CARDIOLOGY CLINIC | Age: 76
End: 2021-06-25
Payer: MEDICARE

## 2021-06-25 VITALS
RESPIRATION RATE: 18 BRPM | DIASTOLIC BLOOD PRESSURE: 81 MMHG | OXYGEN SATURATION: 98 % | SYSTOLIC BLOOD PRESSURE: 160 MMHG | WEIGHT: 192.4 LBS | HEART RATE: 52 BPM | TEMPERATURE: 98.2 F | HEIGHT: 69 IN | BODY MASS INDEX: 28.5 KG/M2

## 2021-06-25 DIAGNOSIS — I10 ESSENTIAL HYPERTENSION: Primary | ICD-10-CM

## 2021-06-25 PROCEDURE — G8432 DEP SCR NOT DOC, RNG: HCPCS | Performed by: INTERNAL MEDICINE

## 2021-06-25 PROCEDURE — 3017F COLORECTAL CA SCREEN DOC REV: CPT | Performed by: INTERNAL MEDICINE

## 2021-06-25 PROCEDURE — G8754 DIAS BP LESS 90: HCPCS | Performed by: INTERNAL MEDICINE

## 2021-06-25 PROCEDURE — 99214 OFFICE O/P EST MOD 30 MIN: CPT | Performed by: INTERNAL MEDICINE

## 2021-06-25 PROCEDURE — 1101F PT FALLS ASSESS-DOCD LE1/YR: CPT | Performed by: INTERNAL MEDICINE

## 2021-06-25 PROCEDURE — G8753 SYS BP > OR = 140: HCPCS | Performed by: INTERNAL MEDICINE

## 2021-06-25 PROCEDURE — G8419 CALC BMI OUT NRM PARAM NOF/U: HCPCS | Performed by: INTERNAL MEDICINE

## 2021-06-25 PROCEDURE — G8536 NO DOC ELDER MAL SCRN: HCPCS | Performed by: INTERNAL MEDICINE

## 2021-06-25 PROCEDURE — G8427 DOCREV CUR MEDS BY ELIG CLIN: HCPCS | Performed by: INTERNAL MEDICINE

## 2021-06-25 RX ORDER — GABAPENTIN 300 MG/1
300 CAPSULE ORAL 3 TIMES DAILY
COMMUNITY

## 2021-06-25 NOTE — PROGRESS NOTES
Kamlesh Butts presents today for   Chief Complaint   Patient presents with    Follow-up     [address] preferred language for health care discussion is english/other. Personal Protective Equipment:   Personal Protective Equipment was used including: mask-surgical and hands-gloves. Patient was placed on no precaution(s). Patient was masked. Precautions:   Patient currently on None  Patient currently roomed with door closed    Is someone accompanying this pt? yes    Is the patient using any DME equipment during [address]? no    Depression Screening:  3 most recent PHQ Screens 9/25/2019   Little interest or pleasure in doing things Not at all   Feeling down, depressed, irritable, or hopeless Not at all   Total Score PHQ 2 0       Learning Assessment:  Learning Assessment 12/29/2016   PRIMARY LEARNER Patient   PRIMARY LANGUAGE ENGLISH   LEARNER PREFERENCE PRIMARY DEMONSTRATION   ANSWERED BY pat   RELATIONSHIP SELF       Abuse Screening:  No flowsheet data found. Fall Risk  Fall Risk Assessment, last 12 mths 6/25/2021   Able to walk? Yes   Fall in past 12 months? 0   Do you feel unsteady? 0   Are you worried about falling 0       Pt currently taking Anticoagulant therapy? no    Coordination of Care:  1. Have you been to the ER, urgent care clinic since your last visit? Hospitalized since your last visit? no    2. Have you seen or consulted any other health care providers outside of the [address] [place] [address] since your last visit? Include any pap smears or colon screening.  no

## 2021-07-04 NOTE — PROGRESS NOTES
Subjective:      Koki Sher is in the office today for cardiac reevaluation. He has a history of hypertension, chronic atrial fibrillation,sick sinus syndrome with prior permanent pacemaker implantation and dyslipidemia. In regard to his atrial fibrillation, he has been maintained on Eliquis for stroke prevention and a combination of a BB and a CCB for rate control. In the office today reports that his continues to have some swelling in his feet. He denies shortness of breath. He reports easy fatigue. He recently saw his regular physician at the [facility]. He tries to do some work in the backyard. Overall, there have been no significant  changes in his condition. Patient Active Problem List    Diagnosis Date Noted    Positive colorectal cancer screening using Cologuard test 07/17/2019    Gastroesophageal reflux disease 07/17/2019    Paroxysmal A-fib ([facility] 06/30/2017    Preoperative clearance 01/01/2017    Left-sided carotid artery disease ([facility] 01/01/2017    Sick sinus syndrome ([facility] 01/01/2017    Presence of permanent cardiac pacemaker 01/01/2017    History of venous disease 01/01/2017    Essential hypertension 01/01/2017    Dyslipidemia 01/01/2017    Tobacco abuse 01/01/2017    Family history of premature CAD 01/01/2017     Current Outpatient Medications   Medication Sig Dispense Refill    gabapentin (NEURONTIN) 300 mg capsule Take 300 mg by mouth three (3) times daily.  traZODone (DESYREL) 50 mg tablet 50 mg nightly.  OMEPRAZOLE PO Take  by mouth daily.  cholecalciferol (VITAMIN D3) 1,000 unit tablet Take  by mouth daily.  apixaban (ELIQUIS) 5 mg tablet Take 1 Tab by mouth two (2) times a day. 180 Tab 3    hydrALAZINE (APRESOLINE) 25 mg tablet two (2) times a day.  cyanocobalamin (VITAMIN B12) 1,000 mcg/mL injection       omega-3 fatty acids-vitamin e (FISH OIL) 1,000 mg cap Take 1 Cap by mouth.       albuterol (VENTOLIN HFA) 90 mcg/actuation inhaler Take  by inhalation.  OXYGEN-AIR DELIVERY SYSTEMS (HORIZON NASAL CPAP SYSTEM) by Does Not Apply route.  atorvastatin (LIPITOR) 20 mg tablet       diltiazem (TIAZAC) 360 mg SR capsule 360 mg daily.  FLOVENT  mcg/actuation inhaler as needed.  metoprolol succinate (TOPROL-XL) 100 mg tablet 100 mg two (2) times a day. Allergies   Allergen Reactions    Latex, Natural Rubber Contact Dermatitis    Advair Diskus [Fluticasone Propion-Salmeterol] Nausea and Vomiting    Prednisone Hives     Past Medical History:   Diagnosis Date    Arrhythmia     Atrial fibrillation (HCC)     Chronic obstructive pulmonary disease (HCC)     GERD (gastroesophageal reflux disease)     Hypercholesteremia     Hypertension     Pacemaker     Sleep apnea     CPAP    Stroke [facility]) 2002    NO RESIDUAL     Past Surgical History:   Procedure Laterality Date    COLONOSCOPY N/A 7/17/2019    COLONOSCOPY performed by Michelle Dean MD at [address] [place] [address] Drive ENDOSCOPY    HX APPENDECTOMY      HX PACEMAKER       No family history on file.   Social History     Tobacco Use   Smoking Status Former Smoker   Smokeless Tobacco Never Used          Review of Systems, additional:  Constitutional: negative  Eyes: negative  Respiratory: negative  Cardiovascular: negative  Gastrointestinal: negative  Musculoskeletal:negative  Neurological: negative  Behvioral/Psych: negative  Endocrine: negative  ENT: negative    Objective:     Visit Vitals  BP (!) 160/81 (BP 1 Location: Left upper arm, BP Patient Position: Sitting, BP Cuff Size: Adult)   Pulse (!) 52   Temp 98.2 °F (36.8 °C) (Temporal)   Resp 18   Ht 5' 9\" (1.753 m)   Wt 87.3 kg (192 lb 6.4 oz)   SpO2 98%   BMI 28.41 kg/m²     General:  alert, cooperative, no distress   Chest Wall: inspection normal - no chest wall deformities or tenderness, respiratory effort normal   Lung: clear to auscultation bilaterally   Heart:  normal rate and regular rhythm, no gallops noted   Abdomen: soft, non-tender. Bowel sounds normal. No masses,  no organomegaly   Extremities: extremities normal, atraumatic, no cyanosis or edema Skin: no rashes   Neuro: alert, oriented, normal speech, no focal findings or movement disorder noted         Assessment/Plan:       ICD-10-CM ICD-9-CM    1. Essential hypertension, systolic BP is elevated in office today and he will keep a blood pressure log prior to his next appointment in 6 months. I10 401.9    2. Left-sided carotid artery disease ([facility] [address] followed by vascular I77.9 447.9    3. Chronic a-fib ([facility] on Eliquis for anticoagulation, diltiazem and metoprolol for rate control. I48.0 427.31    4. [facility] S/P PPM. I49.5 427.81    5. Dyslipidemia, followed by PCP E78.5 272.4    6. Family history of premature CAD Z82.49 V17.3    7. History of venous disease, S/P vein stripping Z86.79 V12.59    8. Presence of permanent cardiac pacemaker, initial implant 2007, generator replacement 2014 all done at [facility].  Z95.0 V45.01    9.  Tobacco abuse Z72.0 305.1    10     Auditory hallucinations, following with neurology

## 2021-07-30 ENCOUNTER — TELEPHONE (OUTPATIENT)
Dept: CARDIOLOGY CLINIC | Age: 76
End: 2021-07-30

## 2021-07-30 NOTE — PROGRESS NOTES
I have personally seen and evaluated the device findings. Interrogation reviewed and I agree with assessment.     [employer] noted, otherwise normal lead/device function

## 2021-07-30 NOTE — TELEPHONE ENCOUNTER
Reviewed unscheduled transmission from home monitoring of pacemaker. Patient has noise on lead and will need to have in office device check. Left message on machine to call the office back.

## 2021-08-18 ENCOUNTER — OFFICE VISIT (OUTPATIENT)
Dept: CARDIOLOGY CLINIC | Age: 76
End: 2021-08-18
Payer: MEDICARE

## 2021-08-18 DIAGNOSIS — I49.5 SICK SINUS SYNDROME (HCC): Primary | ICD-10-CM

## 2021-08-18 DIAGNOSIS — Z95.0 PRESENCE OF PERMANENT CARDIAC PACEMAKER: ICD-10-CM

## 2021-08-18 PROCEDURE — 93294 REM INTERROG EVL PM/LDLS PM: CPT | Performed by: INTERNAL MEDICINE

## 2021-08-18 PROCEDURE — 93296 REM INTERROG EVL PM/IDS: CPT | Performed by: INTERNAL MEDICINE

## 2021-08-27 NOTE — PROGRESS NOTES
I have personally seen and evaluated the device findings. Interrogation reviewed and I agree with assessment.     Lela Riojas

## 2021-11-09 ENCOUNTER — TELEPHONE (OUTPATIENT)
Dept: CARDIOLOGY CLINIC | Age: 76
End: 2021-11-09

## 2021-11-09 NOTE — TELEPHONE ENCOUNTER
Patients wife called asking what is a good medication he can take that doesn't interfere with his pacemaker.

## 2021-11-10 NOTE — TELEPHONE ENCOUNTER
Attempted to contact pt at  number, no answer. Lvm for pt to return call to office at [phone]. Will continue to try to contact pt.

## 2021-11-10 NOTE — TELEPHONE ENCOUNTER
Incoming from pts wife. Two patient Identifiers confirmed. Advised she wanted to know if it was ok to take pain meds if needed for pain with current medication. Advised pts wife per Dr Glenwood Hashimoto ok to take low dose tylenol but not on a daily basis. Pts wife verbalized understanding and stated it would be intermittent.

## 2021-12-22 ENCOUNTER — OFFICE VISIT (OUTPATIENT)
Dept: CARDIOLOGY CLINIC | Age: 76
End: 2021-12-22
Payer: MEDICARE

## 2021-12-22 VITALS
BODY MASS INDEX: 28.5 KG/M2 | WEIGHT: 193 LBS | OXYGEN SATURATION: 99 % | SYSTOLIC BLOOD PRESSURE: 138 MMHG | TEMPERATURE: 98.2 F | DIASTOLIC BLOOD PRESSURE: 64 MMHG | RESPIRATION RATE: 16 BRPM | HEART RATE: 53 BPM

## 2021-12-22 DIAGNOSIS — I49.5 SICK SINUS SYNDROME (HCC): Primary | ICD-10-CM

## 2021-12-22 PROCEDURE — 3017F COLORECTAL CA SCREEN DOC REV: CPT | Performed by: INTERNAL MEDICINE

## 2021-12-22 PROCEDURE — G8427 DOCREV CUR MEDS BY ELIG CLIN: HCPCS | Performed by: INTERNAL MEDICINE

## 2021-12-22 PROCEDURE — G8752 SYS BP LESS 140: HCPCS | Performed by: INTERNAL MEDICINE

## 2021-12-22 PROCEDURE — G8510 SCR DEP NEG, NO PLAN REQD: HCPCS | Performed by: INTERNAL MEDICINE

## 2021-12-22 PROCEDURE — G8419 CALC BMI OUT NRM PARAM NOF/U: HCPCS | Performed by: INTERNAL MEDICINE

## 2021-12-22 PROCEDURE — G8754 DIAS BP LESS 90: HCPCS | Performed by: INTERNAL MEDICINE

## 2021-12-22 PROCEDURE — G8536 NO DOC ELDER MAL SCRN: HCPCS | Performed by: INTERNAL MEDICINE

## 2021-12-22 PROCEDURE — 1101F PT FALLS ASSESS-DOCD LE1/YR: CPT | Performed by: INTERNAL MEDICINE

## 2021-12-22 PROCEDURE — 99214 OFFICE O/P EST MOD 30 MIN: CPT | Performed by: INTERNAL MEDICINE

## 2021-12-22 RX ORDER — GALANTAMINE HYDROBROMIDE 8 MG/1
CAPSULE, EXTENDED RELEASE ORAL
COMMUNITY
Start: 2021-01-15

## 2021-12-22 NOTE — PATIENT INSTRUCTIONS
Heart-Healthy Diet: Care Instructions  Your Care Instructions     A heart-healthy diet has lots of vegetables, fruits, nuts, beans, and whole grains, and is low in salt. It limits foods that are high in saturated fat, such as meats, cheeses, and fried foods. It may be hard to change your diet, but even small changes can lower your risk of heart attack and heart disease. Follow-up care is a key part of your treatment and safety. Be sure to make and go to all appointments, and call your doctor if you are having problems. It's also a good idea to know your test results and keep a list of the medicines you take. How can you care for yourself at home? Watch your portions  · Use food labels to learn what the recommended servings are for the foods you eat. · Eat only the number of calories you need to stay at a healthy weight. If you need to lose weight, eat fewer calories than your body burns (through exercise and other physical activity). Eat more fruits and vegetables  · Eat a variety of fruit and vegetables every day. Dark green, deep orange, red, or yellow fruits and vegetables are especially good for you. Examples include spinach, carrots, peaches, and berries. · Keep carrots, celery, and other veggies handy for snacks. Buy fruit that is in season and store it where you can see it so that you will be tempted to eat it. · Cook dishes that have a lot of veggies in them, such as stir-fries and soups. Limit saturated fat  · Read food labels, and try to avoid saturated fats. They increase your risk of heart disease. · Use olive or canola oil when you cook. · Bake, broil, grill, or steam foods instead of frying them. · Choose lean meats instead of high-fat meats such as hot dogs and sausages. Cut off all visible fat when you prepare meat. · Eat fish, skinless poultry, and meat alternatives such as soy products instead of high-fat meats.  Soy products, such as tofu, may be especially good for your heart.  · Choose low-fat or fat-free milk and dairy products. Eat foods high in fiber  · Eat a variety of grain products every day. Include whole-grain foods that have lots of fiber and nutrients. Examples of whole-grain foods include oats, whole wheat bread, and brown rice. · Buy whole-grain breads and cereals, instead of white bread or pastries. Limit salt and sodium  · Limit how much salt and sodium you eat to help lower your blood pressure. · Taste food before you salt it. Add only a little salt when you think you need it. With time, your taste buds will adjust to less salt. · Eat fewer snack items, fast foods, and other high-salt, processed foods. Check food labels for the amount of sodium in packaged foods. · Choose low-sodium versions of canned goods (such as soups, vegetables, and beans). Limit sugar  · Limit drinks and foods with added sugar. These include candy, desserts, and soda pop. Limit alcohol  · Limit alcohol to no more than 2 drinks a day for men and 1 drink a day for women. Too much alcohol can cause health problems. When should you call for help? Watch closely for changes in your health, and be sure to contact your doctor if:    · You would like help planning heart-healthy meals. Where can you learn more? Go to http://www.mendez.com/  Enter V137 in the search box to learn more about \"Heart-Healthy Diet: Care Instructions. \"  Current as of: December 17, 2020               Content Version: 13.0  © 9735-5956 [employer]. Care instructions adapted under license by [employer] (which disclaims liability or warranty for this information). If you have questions about a medical condition or this instruction, always ask your healthcare professional. [place] [address] any warranty or liability for your use of this information.

## 2021-12-22 NOTE — PROGRESS NOTES
Identified pt with two pt identifiers(name and ). Reviewed record in preparation for visit and have obtained necessary documentation. Heron Duran presents today for   Chief Complaint   Patient presents with    Follow-up     6m       Lokesh Vazquez preferred language for health care discussion is english/other. Personal Protective Equipment:   Personal Protective Equipment was used including: mask-surgical and hands-gloves. Patient was placed on no precaution(s). Patient was masked. Precautions:   Patient currently on None  Patient currently roomed with door closed    Is someone accompanying this pt? Yes wife    Is the patient using any DME equipment during [address]? no    Depression Screening:  3 most recent PHQ Screens 2019   Little interest or pleasure in doing things Not at all   Feeling down, depressed, irritable, or hopeless Not at all   Total Score PHQ 2 0       Learning Assessment:  Learning Assessment 2016   PRIMARY LEARNER Patient   PRIMARY LANGUAGE ENGLISH   LEARNER PREFERENCE PRIMARY DEMONSTRATION   ANSWERED BY pat   RELATIONSHIP SELF       Abuse Screening:  No flowsheet data found. Fall Risk  Fall Risk Assessment, last 12 mths 2021   Able to walk? Yes   Fall in past 12 months? 0   Do you feel unsteady? 0   Are you worried about falling 0       Pt currently taking Anticoagulant therapy? no    Coordination of Care:  1. Have you been to the ER, urgent care clinic since your last visit? Hospitalized since your last visit? yes    2. Have you seen or consulted any other health care providers outside of the [address] [place] [address] since your last visit? Include any pap smears or colon screening. no      Please see Red banners under Allergies and Med Rec to remove outside inquires. All correct information has been verified with patient and added to chart.      Medication's patient's would liked removed has been marked not taking to be removed per Verbal order and read back per Makayla De Los Santos MD

## 2022-01-01 NOTE — PROGRESS NOTES
Subjective:      Pooja Crump is in the office today for cardiac reevaluation. He is a 68-year-old man with a history of hypertension, chronic atrial fibrillation, sick sinus syndrome with prior permanent pacemaker implantation and dyslipidemia. In regard to his atrial fibrillation, he has been maintained on Eliquis for stroke prevention and a combination of a BB and a CCB for rate control. In the office today, he reports that he is doing reasonably well. He did have a fall off of a ladder while trying to clip some bushes in his backyard. He tries to maintain a good level of activity and still works in his yard on a regular basis. He has had no chest pain. He does have some occasional shortness of breath and was wheezing last evening. This is essentially unchanged. He has had no dizziness, near syncope or syncope. Patient Active Problem List    Diagnosis Date Noted    Positive colorectal cancer screening using Cologuard test 07/17/2019    Gastroesophageal reflux disease 07/17/2019    [facility]oxysmal [facility] ([facility] [address] 06/30/2017    Preoperative clearance 01/01/2017    Left-sided carotid artery disease ([facility] [address] 01/01/2017    Sick sinus syndrome ([facility] [address] 01/01/2017    Presence of permanent cardiac pacemaker 01/01/2017    History of venous disease 01/01/2017    Essential hypertension 01/01/2017    Dyslipidemia 01/01/2017    Tobacco abuse 01/01/2017    Family history of premature CAD 01/01/2017     Current Outpatient Medications   Medication Sig Dispense Refill    galantamine (RAZADYNE) 8 mg SR capsule       gabapentin (NEURONTIN) 300 mg capsule Take 300 mg by mouth three (3) times daily.  traZODone (DESYREL) 50 mg tablet 50 mg nightly.  omeprazole (PRILOSEC) 40 mg capsule Take  by mouth daily.  cholecalciferol (VITAMIN D3) 1,000 unit tablet Take  by mouth daily.  apixaban (ELIQUIS) 5 mg tablet Take 1 Tab by mouth two (2) times a day.  180 Tab 3    hydrALAZINE (APRESOLINE) 25 mg tablet two (2) times a day.  cyanocobalamin (VITAMIN B12) 1,000 mcg/mL injection       omega-3 fatty acids-vitamin e (FISH OIL) 1,000 mg cap Take 1 Cap by mouth.  albuterol (VENTOLIN HFA) 90 mcg/actuation inhaler Take  by inhalation.  OXYGEN-AIR DELIVERY SYSTEMS (HORIZON NASAL CPAP SYSTEM) by Does Not Apply route.  atorvastatin (LIPITOR) 20 mg tablet       diltiazem (TIAZAC) 360 mg SR capsule 360 mg daily.  FLOVENT  mcg/actuation inhaler as needed.  metoprolol succinate (TOPROL-XL) 100 mg tablet 100 mg two (2) times a day. Allergies   Allergen Reactions    Latex, Natural Rubber Contact Dermatitis    Advair Diskus [Fluticasone Propion-Salmeterol] Nausea and Vomiting    Prednisone Hives     Past Medical History:   Diagnosis Date    Arrhythmia     Atrial fibrillation (HCC)     Chronic obstructive pulmonary disease (HCC)     GERD (gastroesophageal reflux disease)     Hypercholesteremia     Hypertension     Pacemaker     Sleep apnea     CPAP    Stroke [facility]) 2002    NO RESIDUAL     Past Surgical History:   Procedure Laterality Date    COLONOSCOPY N/A 7/17/2019    [facility]    HX APPENDECTOMY      HX PACEMAKER       No family history on file.   Social History     Tobacco Use   Smoking Status Former Smoker   Smokeless Tobacco Never Used          Review of Systems, additional:  Constitutional: negative  Eyes: negative  Respiratory: negative  Cardiovascular: negative  Gastrointestinal: negative  Musculoskeletal:negative  Neurological: negative  Behvioral/Psych: negative  Endocrine: negative  ENT: negative    Objective:     Visit Vitals  /64   Pulse (!) 53   Temp 98.2 °F (36.8 °C) (Temporal)   Resp 16   Wt 87.5 kg (193 lb)   SpO2 99%   BMI 28.50 kg/m²     General:  alert, cooperative, no distress   Chest Wall: inspection normal - no chest wall deformities or tenderness, respiratory effort normal   Lung: clear to auscultation bilaterally   Heart:  normal rate and regular rhythm, no gallops noted   Abdomen: soft, non-tender. Bowel sounds normal. No masses,  no organomegaly   Extremities: extremities normal, atraumatic, no cyanosis or edema Skin: no rashes   Neuro: alert, oriented, normal speech, no focal findings or movement disorder noted         Assessment/Plan:       ICD-10-CM ICD-9-CM    1. Essential hypertension, pressure is controlled  in the office today I10 401.9    2. Left-sided carotid artery disease ([facility] [address] followed by vascular I77.9 447.9    3. Chronic a-fib ([facility] [address] on Eliquis for stroke prevention , diltiazem and metoprolol for rate control. Return in 6 months I48.0 427.31    4. Sick sinus syndrome ([facility] [address] S/P PPM. I49.5 427.81    5. Dyslipidemia, followed by PCP E78.5 272.4    6. Family history of premature CAD Z82.49 V17.3    7. History of venous disease, S/P vein stripping Z86.79 V12.59    8. Presence of permanent cardiac pacemaker, [employer] device, Yair [id number], VVIR, initial implant 2007, generator replacement 2014 all done at [facility]. Last in house interrogation 8/17/2021. Z95.0 V45.01    9. Tobacco abuse Z72.0 305.1    10     Auditory hallucinations, following with neurology  11     Falls, single incident of falling off a ladder.

## 2022-02-23 ENCOUNTER — OFFICE VISIT (OUTPATIENT)
Dept: CARDIOLOGY CLINIC | Age: 77
End: 2022-02-23
Payer: MEDICARE

## 2022-02-23 DIAGNOSIS — I49.5 SICK SINUS SYNDROME (HCC): Primary | ICD-10-CM

## 2022-02-23 DIAGNOSIS — Z95.0 PRESENCE OF PERMANENT CARDIAC PACEMAKER: ICD-10-CM

## 2022-03-19 PROBLEM — Z82.49 FAMILY HISTORY OF PREMATURE CAD: Status: ACTIVE | Noted: 2017-01-01

## 2022-03-19 PROBLEM — Z95.0 PRESENCE OF PERMANENT CARDIAC PACEMAKER: Status: ACTIVE | Noted: 2017-01-01

## 2022-03-19 PROBLEM — Z01.818 PREOPERATIVE CLEARANCE: Status: ACTIVE | Noted: 2017-01-01

## 2022-03-19 PROBLEM — I77.9 LEFT-SIDED CAROTID ARTERY DISEASE (HCC): Status: ACTIVE | Noted: 2017-01-01

## 2022-03-19 PROBLEM — Z72.0 TOBACCO ABUSE: Status: ACTIVE | Noted: 2017-01-01

## 2022-03-19 PROBLEM — E78.5 DYSLIPIDEMIA: Status: ACTIVE | Noted: 2017-01-01

## 2022-03-19 PROBLEM — Z86.79 HISTORY OF VENOUS DISEASE: Status: ACTIVE | Noted: 2017-01-01

## 2022-03-19 PROBLEM — I48.0 PAROXYSMAL A-FIB (HCC): Status: ACTIVE | Noted: 2017-06-30

## 2022-03-19 PROBLEM — I10 ESSENTIAL HYPERTENSION: Status: ACTIVE | Noted: 2017-01-01

## 2022-03-19 PROBLEM — I49.5 SICK SINUS SYNDROME (HCC): Status: ACTIVE | Noted: 2017-01-01

## 2022-03-20 PROBLEM — R19.5 POSITIVE COLORECTAL CANCER SCREENING USING COLOGUARD TEST: Status: ACTIVE | Noted: 2019-07-17

## 2022-03-20 PROBLEM — K21.9 GASTROESOPHAGEAL REFLUX DISEASE: Status: ACTIVE | Noted: 2019-07-17

## 2022-04-20 PROCEDURE — 93294 REM INTERROG EVL PM/LDLS PM: CPT | Performed by: INTERNAL MEDICINE

## 2022-04-20 NOTE — PROGRESS NOTES
I have personally seen and evaluated the device findings. Interrogation reviewed and I agree with assessment.     Mirela Dalton

## 2022-05-16 NOTE — TELEPHONE ENCOUNTER
Did you want to give her something? I will call Mara Denton Tuesday to have him bring her in this week if you like, so we can check her out. GI doctor called requesting the patient to stop his Eliquis on 7/15/2019 for surgery on 7/17/2019. Office said they will call back to confirm. Please advise.

## 2022-06-20 ENCOUNTER — OFFICE VISIT (OUTPATIENT)
Dept: CARDIOLOGY CLINIC | Age: 77
End: 2022-06-20
Payer: MEDICARE

## 2022-06-20 VITALS
DIASTOLIC BLOOD PRESSURE: 69 MMHG | BODY MASS INDEX: 28.21 KG/M2 | OXYGEN SATURATION: 97 % | HEART RATE: 69 BPM | RESPIRATION RATE: 16 BRPM | TEMPERATURE: 98.1 F | WEIGHT: 191 LBS | SYSTOLIC BLOOD PRESSURE: 131 MMHG

## 2022-06-20 DIAGNOSIS — I10 ESSENTIAL HYPERTENSION: Primary | ICD-10-CM

## 2022-06-20 PROCEDURE — G8417 CALC BMI ABV UP PARAM F/U: HCPCS | Performed by: INTERNAL MEDICINE

## 2022-06-20 PROCEDURE — G8427 DOCREV CUR MEDS BY ELIG CLIN: HCPCS | Performed by: INTERNAL MEDICINE

## 2022-06-20 PROCEDURE — G8752 SYS BP LESS 140: HCPCS | Performed by: INTERNAL MEDICINE

## 2022-06-20 PROCEDURE — 3017F COLORECTAL CA SCREEN DOC REV: CPT | Performed by: INTERNAL MEDICINE

## 2022-06-20 PROCEDURE — 99214 OFFICE O/P EST MOD 30 MIN: CPT | Performed by: INTERNAL MEDICINE

## 2022-06-20 PROCEDURE — G8754 DIAS BP LESS 90: HCPCS | Performed by: INTERNAL MEDICINE

## 2022-06-20 PROCEDURE — 1101F PT FALLS ASSESS-DOCD LE1/YR: CPT | Performed by: INTERNAL MEDICINE

## 2022-06-20 PROCEDURE — G8536 NO DOC ELDER MAL SCRN: HCPCS | Performed by: INTERNAL MEDICINE

## 2022-06-20 PROCEDURE — G8510 SCR DEP NEG, NO PLAN REQD: HCPCS | Performed by: INTERNAL MEDICINE

## 2022-06-20 PROCEDURE — 1123F ACP DISCUSS/DSCN MKR DOCD: CPT | Performed by: INTERNAL MEDICINE

## 2022-06-20 PROCEDURE — 93000 ELECTROCARDIOGRAM COMPLETE: CPT | Performed by: INTERNAL MEDICINE

## 2022-06-20 RX ORDER — UREA 10 %
100 LOTION (ML) TOPICAL DAILY
COMMUNITY

## 2022-06-20 NOTE — PROGRESS NOTES
Identified pt with two pt identifiers(name and ). Reviewed record in preparation for visit and have obtained necessary documentation. Bryan Molina presents today for   Chief Complaint   Patient presents with    Follow-up     6m       Pt c/o  HEADACHES. Bryan Molina preferred language for health care discussion is english/other. Personal Protective Equipment:   Personal Protective Equipment was used including: mask-surgical and hands-gloves. Patient was placed on no precaution(s). Patient was masked. Precautions:   Patient currently on None  Patient currently roomed with door closed. Is someone accompanying this pt? Yes wife    Is the patient using any DME equipment during [address]? no    Depression Screening:  3 most recent PHQ Screens 2022   Little interest or pleasure in doing things Not at all   Feeling down, depressed, irritable, or hopeless Not at all   Total Score PHQ 2 0       Learning Assessment:  Learning Assessment 2016   PRIMARY LEARNER Patient   PRIMARY LANGUAGE ENGLISH   LEARNER PREFERENCE PRIMARY DEMONSTRATION   ANSWERED BY pat   RELATIONSHIP SELF       Abuse Screening:  No flowsheet data found. Fall Risk  Fall Risk Assessment, last 12 mths 2022   Able to walk? Yes   Fall in past 12 months? -   Do you feel unsteady? -   Are you worried about falling -       Pt currently taking Anticoagulant therapy?no  Pt currently taking Antiplatelet therapy? yes    Coordination of Care:  1. Have you been to the ER, urgent care clinic since your last visit? Hospitalized since your last visit? no    2. Have you seen or consulted any other health care providers outside of the [address] [place] [address] since your last visit? Include any pap smears or colon screening. no      Please see Red banners under Allergies and Med Rec to remove outside inquires. All correct information has been verified with patient and added to chart.      Medication's patient's would liked removed has been marked not taking to be removed per Verbal order and read back per Pepe Ji MD

## 2022-06-26 NOTE — PROGRESS NOTES
Subjective:      Manny Park is in the office today for cardiac reevaluation. He is a 44-year-old man with a history of hypertension, chronic atrial fibrillation, sick sinus syndrome with prior permanent pacemaker implantation and dyslipidemia. In regard to his atrial fibrillation, he has been maintained on Eliquis for stroke prevention and a combination of a BB and a CCB for rate control. In the office today, he reports that he has been doing \"great \". His breathing has been okay. He does report  occasional wheezing. He was given a nebulizer by the [facility] which seems to help. He had an additional fall, tripping over a wire. He did not sustain any significant injury. .    Patient Active Problem List    Diagnosis Date Noted    Positive colorectal cancer screening using Cologuard test 07/17/2019    Gastroesophageal reflux disease 07/17/2019    Paroxysmal [facility]-fib ([facility] [address] 06/30/2017    Preoperative clearance 01/01/2017    Left-sided carotid artery disease ([facility] [address] 01/01/2017    Sick sinus syndrome ([facility] [address] 01/01/2017    Presence of permanent cardiac pacemaker 01/01/2017    History of venous disease 01/01/2017    Essential hypertension 01/01/2017    Dyslipidemia 01/01/2017    Tobacco abuse 01/01/2017    Family history of premature CAD 01/01/2017     Current Outpatient Medications   Medication Sig Dispense Refill    cyanocobalamin (VITAMIN B12) 100 mcg tablet Take 100 mcg by mouth daily.  galantamine (RAZADYNE) 8 mg SR capsule       gabapentin (NEURONTIN) 300 mg capsule Take 300 mg by mouth three (3) times daily.  traZODone (DESYREL) 50 mg tablet 50 mg nightly.  omeprazole (PRILOSEC) 40 mg capsule Take  by mouth daily.  cholecalciferol (VITAMIN D3) 1,000 unit tablet Take  by mouth daily.  apixaban (ELIQUIS) 5 mg tablet Take 1 Tab by mouth two (2) times a day. 180 Tab 3    hydrALAZINE (APRESOLINE) 25 mg tablet two (2) times a day.       omega-3 fatty acids-vitamin e (FISH OIL) 1,000 mg cap Take 1 Cap by mouth.  albuterol (VENTOLIN HFA) 90 mcg/actuation inhaler Take  by inhalation.  OXYGEN-AIR DELIVERY SYSTEMS (HORIZON NASAL CPAP SYSTEM) by Does Not Apply route.  atorvastatin (LIPITOR) 20 mg tablet       diltiazem (TIAZAC) 360 mg SR capsule 360 mg daily.  FLOVENT  mcg/actuation inhaler as needed.  metoprolol succinate (TOPROL-XL) 100 mg tablet 100 mg two (2) times a day. Allergies   Allergen Reactions    Latex, Natural Rubber Contact Dermatitis    Advair Diskus [Fluticasone Propion-Salmeterol] Nausea and Vomiting    Prednisone Hives     Past Medical History:   Diagnosis Date    Arrhythmia     Atrial fibrillation (HCC)     Chronic obstructive pulmonary disease (HCC)     GERD (gastroesophageal reflux disease)     Hypercholesteremia     Hypertension     Pacemaker     Sleep apnea     CPAP    Stroke [facility]) 2002    NO RESIDUAL     Past Surgical History:   Procedure Laterality Date    COLONOSCOPY N/A 7/17/2019    COLONOSCOPY performed by Arden [facility]rizarry [facility] at [facility] ENDOSCOPY    HX APPENDECTOMY      HX PACEMAKER       No family history on file. Social History     Tobacco Use   Smoking Status Former Smoker   Smokeless Tobacco Never Used          Review of Systems, additional:  Constitutional: negative  Eyes: negative  Respiratory: negative  Cardiovascular: negative  Gastrointestinal: negative  Musculoskeletal:negative  Neurological: negative  Behvioral/Psych: negative  Endocrine: negative  ENT: negative    Objective:     Visit Vitals  /69   Pulse 69   Temp 98.1 °F (36.7 °C) (Temporal)   Resp 16   Wt 86.6 kg (191 lb)   SpO2 97%   BMI 28.21 kg/m²     General:  alert, cooperative, no distress   Chest Wall: inspection normal - no chest wall deformities or tenderness, respiratory effort normal   Lung: clear to auscultation bilaterally   Heart:  normal rate and regular rhythm, no gallops noted   Abdomen: soft, non-tender.  Bowel sounds normal. No masses,  no organomegaly   Extremities: extremities normal, atraumatic, no cyanosis. There is 2+ ankle edema bilaterally. Skin: no rashes   Neuro: alert, oriented, normal speech, no focal findings or movement disorder noted         Assessment/Plan:       ICD-10-CM ICD-9-CM    1. Essential hypertension, blood pressure is controlled  in the office today I10 401.9    2. Left-sided carotid artery disease ([facility] [address] followed by vascular I77.9 447.9    3. Chronic a-fib ([facility] [address] on Eliquis for stroke prevention , diltiazem and metoprolol for rate control. Return in 6 months I48.0 427.31    4. Sick sinus syndrome ([facility] [address] S/P PPM. I49.5 427.81    5. Dyslipidemia, followed by PCP E78.5 272.4    6. Family history of premature CAD Z82.49 V17.3    7. History of venous disease, S/P vein stripping Z86.79 V12.59    8. Presence of permanent cardiac pacemaker, [employer] device, Yair [id number], VVIR, initial implant 2007, generator replacement 2014 all done at [facility]. Last in house interrogation 8/17/2021. Z95.0 V45.01    9. Tobacco abuse Z72.0 305.1    10     Auditory hallucinations, following with neurology  11     Falls, single incident of falling off a ladder. Had a another fall since last appointment, tripping over a chair.

## 2022-09-27 NOTE — PROGRESS NOTES
Unscheduled remote interrogation of [employer] dual chamber pacemaker (programmed VVIR mode)    Auto threshold, Impedances, and sensing all remain stable. Battery estimate is 3.5 years.  57%    One non-sustained high Ventricular rate on 8/29/22 @152bpm was collected. Dr. Taylor Quintanilla patient.

## 2022-12-20 ENCOUNTER — CLINICAL SUPPORT (OUTPATIENT)
Dept: CARDIOLOGY CLINIC | Age: 77
End: 2022-12-20
Payer: MEDICARE

## 2022-12-20 DIAGNOSIS — I49.5 SICK SINUS SYNDROME (HCC): Primary | ICD-10-CM

## 2022-12-20 DIAGNOSIS — Z95.0 PRESENCE OF PERMANENT CARDIAC PACEMAKER: ICD-10-CM

## 2022-12-20 PROCEDURE — 93288 INTERROG EVL PM/LDLS PM IP: CPT | Performed by: INTERNAL MEDICINE

## 2022-12-28 ENCOUNTER — OFFICE VISIT (OUTPATIENT)
Dept: CARDIOLOGY CLINIC | Age: 77
End: 2022-12-28
Payer: MEDICARE

## 2022-12-28 VITALS
WEIGHT: 190 LBS | HEART RATE: 61 BPM | DIASTOLIC BLOOD PRESSURE: 72 MMHG | TEMPERATURE: 98 F | OXYGEN SATURATION: 98 % | SYSTOLIC BLOOD PRESSURE: 122 MMHG | BODY MASS INDEX: 28.06 KG/M2

## 2022-12-28 DIAGNOSIS — I48.0 PAROXYSMAL A-FIB (HCC): Primary | ICD-10-CM

## 2022-12-28 PROCEDURE — 3074F SYST BP LT 130 MM HG: CPT | Performed by: INTERNAL MEDICINE

## 2022-12-28 PROCEDURE — 1123F ACP DISCUSS/DSCN MKR DOCD: CPT | Performed by: INTERNAL MEDICINE

## 2022-12-28 PROCEDURE — 3078F DIAST BP <80 MM HG: CPT | Performed by: INTERNAL MEDICINE

## 2022-12-28 PROCEDURE — G8536 NO DOC ELDER MAL SCRN: HCPCS | Performed by: INTERNAL MEDICINE

## 2022-12-28 PROCEDURE — 99214 OFFICE O/P EST MOD 30 MIN: CPT | Performed by: INTERNAL MEDICINE

## 2022-12-28 PROCEDURE — G8417 CALC BMI ABV UP PARAM F/U: HCPCS | Performed by: INTERNAL MEDICINE

## 2022-12-28 PROCEDURE — G8510 SCR DEP NEG, NO PLAN REQD: HCPCS | Performed by: INTERNAL MEDICINE

## 2022-12-28 PROCEDURE — 1101F PT FALLS ASSESS-DOCD LE1/YR: CPT | Performed by: INTERNAL MEDICINE

## 2022-12-28 PROCEDURE — 3017F COLORECTAL CA SCREEN DOC REV: CPT | Performed by: INTERNAL MEDICINE

## 2022-12-28 PROCEDURE — G8427 DOCREV CUR MEDS BY ELIG CLIN: HCPCS | Performed by: INTERNAL MEDICINE

## 2022-12-28 RX ORDER — ATORVASTATIN CALCIUM 80 MG/1
1 TABLET, FILM COATED ORAL
COMMUNITY
Start: 2022-10-21

## 2022-12-28 RX ORDER — ASPIRIN 81 MG/1
1 TABLET ORAL DAILY
COMMUNITY

## 2022-12-28 NOTE — PROGRESS NOTES
Identified pt with two pt identifiers(name and ). Reviewed record in preparation for visit and have obtained necessary documentation. Harmony Rosario presents today for   Chief Complaint   Patient presents with    Follow-up     6 month        Pt denies  DIZZINESS, SOB, CHEST PAIN/ PRESSURE, FATIGUE/WEAKNESS, HEADACHES, SWELLING. Harmony Rosario preferred language for health care discussion is english/other. Personal Protective Equipment:   Personal Protective Equipment was used including: mask-surgical and hands-gloves. Patient was placed on no precaution(s). Patient was masked. Precautions:   Patient currently on None  Patient currently roomed with door closed. Is someone accompanying this pt? yes    Is the patient using any DME equipment during [address]? no    Depression Screening:  3 most recent PHQ Screens 2022   Little interest or pleasure in doing things Not at all   Feeling down, depressed, irritable, or hopeless Not at all   Total Score PHQ 2 0       Learning Assessment:  Learning Assessment 2016   PRIMARY LEARNER Patient   PRIMARY LANGUAGE ENGLISH   LEARNER PREFERENCE PRIMARY DEMONSTRATION   ANSWERED BY pat   RELATIONSHIP SELF       Abuse Screening:  No flowsheet data found. Fall Risk  Fall Risk Assessment, last 12 mths 2022   Able to walk? Yes   Fall in past 12 months? 0   Do you feel unsteady? 0   Are you worried about falling 0       Pt currently taking Anticoagulant therapy? yes  Pt currently taking Antiplatelet therapy? yes    Coordination of Care:  1. Have you been to the ER, urgent care clinic since your last visit? Hospitalized since your last visit? No     2. Have you seen or consulted any other health care providers outside of the [address] [place] [address] since your last visit? Include any pap smears or colon screening. yes      Please see Red banners under Allergies and Med Rec to remove outside inquires.  All correct information has been verified with patient and added to chart.      Medication's patient's would liked removed has been marked not taking to be removed per Verbal order and read back per Mago Hair MD

## 2023-01-02 NOTE — PROGRESS NOTES
Subjective:      Sharon Brian is in the office today for cardiac reevaluation. He is a 51-year-old man with a history of hypertension, chronic atrial fibrillation, sick sinus syndrome with prior permanent pacemaker implantation and dyslipidemia. In regard to his atrial fibrillation, he has been maintained on Eliquis for stroke prevention and a combination of a BB and a CCB for rate control. In the office today, he reports that he feels \"pretty good \". He developed a cough after a viral upper respiratory infection. He has not been more short of breath. He has been more sedentary. He has had lower extremity swelling. He has had no recent falls. Patient Active Problem List    Diagnosis Date Noted    Positive colorectal cancer screening using Cologuard test 07/17/2019    Gastroesophageal reflux disease 07/17/2019    Paroxysmal A-fib (HCC) 06/30/2017    Preoperative clearance 01/01/2017    Left-sided carotid artery disease (HCC) 01/01/2017    Sick sinus syndrome ([address] 01/01/2017    Presence of permanent cardiac pacemaker 01/01/2017    History of venous disease 01/01/2017    Essential hypertension 01/01/2017    Dyslipidemia 01/01/2017    Tobacco abuse 01/01/2017    Family history of premature CAD 01/01/2017     Current Outpatient Medications   Medication Sig Dispense Refill    atorvastatin (LIPITOR) 80 mg tablet Take 1 Tablet by mouth nightly. aspirin delayed-release 81 mg tablet Take 1 Tablet by mouth daily. cyanocobalamin (VITAMIN B12) 100 mcg tablet Take 100 mcg by mouth daily. galantamine (RAZADYNE) 8 mg SR capsule       gabapentin (NEURONTIN) 300 mg capsule Take 300 mg by mouth three (3) times daily. traZODone (DESYREL) 50 mg tablet 50 mg nightly. omeprazole (PRILOSEC) 40 mg capsule Take  by mouth daily. cholecalciferol (VITAMIN D3) (1000 Units /25 mcg) tablet Take  by mouth daily. apixaban (ELIQUIS) 5 mg tablet Take 1 Tab by mouth two (2) times a day. 180 Tab 3    hydrALAZINE (APRESOLINE) 25 mg tablet two (2) times a day. omega-3 fatty acids-vitamin e 1,000 mg cap Take 1 Cap by mouth. albuterol (PROVENTIL HFA, VENTOLIN HFA, PROAIR HFA) 90 mcg/actuation inhaler Take  by inhalation. OXYGEN-AIR DELIVERY SYSTEMS (HORIZON NASAL CPAP SYSTEM) by Does Not Apply route. diltiazem (TIAZAC) 360 mg SR capsule 360 mg daily. FLOVENT  mcg/actuation inhaler as needed. metoprolol succinate (TOPROL-XL) 100 mg tablet 100 mg two (2) times a day. Allergies   Allergen Reactions    Latex, Natural Rubber Contact Dermatitis    Advair Diskus [Fluticasone Propion-Salmeterol] Nausea and Vomiting    Prednisone Hives     Past Medical History:   Diagnosis Date    Arrhythmia     Atrial fibrillation (HCC)     Chronic obstructive pulmonary disease (HCC)     GERD (gastroesophageal reflux disease)     Hypercholesteremia     Hypertension     Pacemaker     Sleep apnea     CPAP    Stroke ([address] 2002    NO RESIDUAL     Past Surgical History:   Procedure Laterality Date    COLONOSCOPY N/A 7/17/2019    COLONOSCOPY performed by Lorna Alvarez MD at [facility] ENDOSCOPY    HX APPENDECTOMY      HX PACEMAKER       No family history on file.   Social History     Tobacco Use   Smoking Status Former   Smokeless Tobacco Never          Review of Systems, additional:  Constitutional: negative  Eyes: negative  Respiratory: negative  Cardiovascular: negative  Gastrointestinal: negative  Musculoskeletal:negative  Neurological: negative  Behvioral/Psych: negative  Endocrine: negative  ENT: negative    Objective:     Visit Vitals  /72   Pulse 61   Temp 98 °F (36.7 °C) (Temporal)   Wt 86.2 kg (190 lb)   SpO2 98%   BMI 28.06 kg/m²     General:  alert, cooperative, no distress   Chest Wall: inspection normal - no chest wall deformities or tenderness, respiratory effort normal   Lung: clear to auscultation bilaterally   Heart:  normal rate and regular rhythm, no gallops noted Abdomen: soft, non-tender. Bowel sounds normal. No masses,  no organomegaly   Extremities: extremities normal, atraumatic, no cyanosis. There is 2+ ankle edema bilaterally. Skin: no rashes   Neuro: alert, oriented, normal speech, no focal findings or movement disorder noted     12 lead ECG.  6/20/2022. Ventricular paced rhythm. Assessment/Plan:       ICD-10-CM ICD-9-CM    1. Essential hypertension, blood pressure is well controlled  in the office today I10 401.9    2. Left-sided carotid artery disease ([facility] [address] followed by vascular I77.9 447.9    3. Chronic a-fib ([facility] [address] on Eliquis for stroke prevention , diltiazem and metoprolol for rate control. Stable symptoms. Return in 6 months I48.0 427.31    4. Sick sinus syndrome ([facility] [address] S/P PPM. I49.5 427.81    5. Dyslipidemia, followed by PCP E78.5 272.4    6. Family history of premature CAD Z82.49 V17.3    7. History of venous disease, S/P vein stripping Z86.79 V12.59    8. Presence of permanent cardiac pacemaker, [employer] device, [place] [id number], VVIR, initial implant 2007, generator replacement 2014 all done at [facility]. Last in house interrogation 8/30/2022. Mode VVIR. Section rate for  bpm.  Longevity 3.5 years Z95.0 V45.01    9. Tobacco abuse Z72.0 305.1    10     Auditory hallucinations, following with neurology  11     Falls, single incident of falling off a ladder.   No recent falls

## 2023-02-08 NOTE — PROGRESS NOTES
I have personally seen and evaluated the device findings. Interrogation reviewed and I agree with assessment.     Ryan Corners

## 2023-07-19 ENCOUNTER — OFFICE VISIT (OUTPATIENT)
Age: 78
End: 2023-07-19
Payer: MEDICARE

## 2023-07-19 DIAGNOSIS — Z95.0 PRESENCE OF PERMANENT CARDIAC PACEMAKER: ICD-10-CM

## 2023-07-19 DIAGNOSIS — I49.5 SICK SINUS SYNDROME (HCC): Primary | ICD-10-CM

## 2023-07-19 PROCEDURE — 93296 REM INTERROG EVL PM/IDS: CPT | Performed by: INTERNAL MEDICINE

## 2023-07-19 PROCEDURE — 93294 REM INTERROG EVL PM/LDLS PM: CPT | Performed by: INTERNAL MEDICINE

## 2023-07-26 NOTE — PROGRESS NOTES
[facility]:  [employer] Dual Pacemaker. 3.5 years left on battery. Lead impedance and threshold WNL. A sensed 99 %, V paced 63 %. Couple of nonsustained VT.  Monitored only

## 2023-08-07 ENCOUNTER — OFFICE VISIT (OUTPATIENT)
Age: 78
End: 2023-08-07

## 2023-08-07 VITALS
OXYGEN SATURATION: 98 % | BODY MASS INDEX: 28.58 KG/M2 | HEART RATE: 50 BPM | HEIGHT: 69 IN | SYSTOLIC BLOOD PRESSURE: 133 MMHG | DIASTOLIC BLOOD PRESSURE: 67 MMHG | WEIGHT: 193 LBS

## 2023-08-07 DIAGNOSIS — I48.0 PAROXYSMAL ATRIAL FIBRILLATION (HCC): Primary | ICD-10-CM

## 2023-08-07 RX ORDER — ASPIRIN 81 MG/1
81 TABLET ORAL DAILY
COMMUNITY

## 2023-08-07 RX ORDER — CHLORAL HYDRATE 500 MG
CAPSULE ORAL DAILY
COMMUNITY

## 2023-08-07 RX ORDER — ATORVASTATIN CALCIUM 80 MG/1
80 TABLET, FILM COATED ORAL DAILY
COMMUNITY

## 2023-08-07 ASSESSMENT — PATIENT HEALTH QUESTIONNAIRE - PHQ9
SUM OF ALL RESPONSES TO PHQ QUESTIONS 1-9: 0
2. FEELING DOWN, DEPRESSED OR HOPELESS: 0
SUM OF ALL RESPONSES TO PHQ QUESTIONS 1-9: 0
1. LITTLE INTEREST OR PLEASURE IN DOING THINGS: 0
SUM OF ALL RESPONSES TO PHQ QUESTIONS 1-9: 0
SUM OF ALL RESPONSES TO PHQ9 QUESTIONS 1 & 2: 0
SUM OF ALL RESPONSES TO PHQ QUESTIONS 1-9: 0

## 2023-08-26 NOTE — PROGRESS NOTES
Dennie Alberta presents today for   Chief Complaint   Patient presents with    Follow-up     yearly       Dennie Alberta preferred language for health care discussion is english/other. Is someone accompanying this pt? no    Is the patient using any DME equipment during OV? no    Depression Screening:  Depression: Not at risk    PHQ-2 Score: 0        Learning Assessment:  Who is the primary learner? Patient    What is the preferred language for health care of the primary learner? ENGLISH    How does the primary learner prefer to learn new concepts? DEMONSTRATION    Answered By patient    Relationship to Learner SELF           Pt currently taking Anticoagulant therapy? Eliquis 5mg    Pt currently taking Antiplatelet therapy ? Aspirin 81 mg      Coordination of Care:  1. Have you been to the ER, urgent care clinic since your last visit? Hospitalized since your last visit? no    2. Have you seen or consulted any other health care providers outside of the [address] [place] [address] since your last visit? Include any pap smears or colon screening.  no
organomegaly   Extremities: extremities normal, atraumatic, no cyanosis. There is 2+ ankle edema bilaterally. Skin: no rashes   Neuro: alert, oriented, normal speech, no focal findings or movement disorder noted     12 lead ECG.  6/20/2022. Ventricular paced rhythm. Assessment/Plan:       ICD-10-CM ICD-9-CM    1. Essential hypertension, blood pressure is well controlled  in the office today I10 401.9    2. Left-sided carotid artery disease ([address]), followed by vascular I77.9 447.9    3. Chronic a-fib ([address]), on Eliquis for stroke prevention , diltiazem and metoprolol for rate control. Stable symptoms. Return in 6 months I48.0 427.31    4. Sick sinus syndrome ([address]), S/P PPM. I49.5 427.81    5. Dyslipidemia, followed by PCP E78.5 272.4    6. Family history of premature CAD Z82.49 V17.3    7. History of venous disease, S/P vein stripping Z86.79 V12.59    8. Presence of permanent cardiac pacemaker, [employer] device, [place] [id number], VVIR, initial implant 2007, generator replacement 2014 all done at [facility]. Mode VVIR. Section rate for  bpm.   Z95.0 V45.01    9. Tobacco abuse Z72.0 305.1    10     Auditory hallucinations, following with neurology  11     Falls, single incident of falling off a ladder.   No recent falls

## 2023-12-19 ENCOUNTER — PROCEDURE VISIT (OUTPATIENT)
Age: 78
End: 2023-12-19
Payer: MEDICARE

## 2023-12-19 DIAGNOSIS — Z95.0 PRESENCE OF PERMANENT CARDIAC PACEMAKER: Primary | ICD-10-CM

## 2023-12-19 DIAGNOSIS — I49.5 SICK SINUS SYNDROME (HCC): ICD-10-CM

## 2024-02-19 PROCEDURE — 93280 PM DEVICE PROGR EVAL DUAL: CPT | Performed by: INTERNAL MEDICINE

## 2024-03-27 ENCOUNTER — NURSE ONLY (OUTPATIENT)
Age: 79
End: 2024-03-27
Payer: MEDICARE

## 2024-03-27 DIAGNOSIS — I49.5 SICK SINUS SYNDROME (HCC): ICD-10-CM

## 2024-03-27 DIAGNOSIS — Z95.0 PRESENCE OF PERMANENT CARDIAC PACEMAKER: Primary | ICD-10-CM

## 2024-04-02 PROCEDURE — 93294 REM INTERROG EVL PM/LDLS PM: CPT | Performed by: INTERNAL MEDICINE

## 2024-04-02 PROCEDURE — 93296 REM INTERROG EVL PM/IDS: CPT | Performed by: INTERNAL MEDICINE

## 2024-07-10 ENCOUNTER — NURSE ONLY (OUTPATIENT)
Age: 79
End: 2024-07-10

## 2024-07-10 DIAGNOSIS — I48.0 PAROXYSMAL ATRIAL FIBRILLATION (HCC): ICD-10-CM

## 2024-07-10 DIAGNOSIS — Z95.0 PRESENCE OF PERMANENT CARDIAC PACEMAKER: Primary | ICD-10-CM

## 2024-07-10 DIAGNOSIS — I49.5 SICK SINUS SYNDROME (HCC): ICD-10-CM

## 2024-07-26 NOTE — RESULT ENCOUNTER NOTE
Device check personally reviewed by me.  Normal device function on interrogation.  No significant arrhythmias noted see scanned interrogation document for complete details.

## 2024-07-29 ENCOUNTER — OFFICE VISIT (OUTPATIENT)
Age: 79
End: 2024-07-29
Payer: MEDICARE

## 2024-07-29 VITALS
BODY MASS INDEX: 29.86 KG/M2 | DIASTOLIC BLOOD PRESSURE: 72 MMHG | HEIGHT: 69 IN | OXYGEN SATURATION: 97 % | SYSTOLIC BLOOD PRESSURE: 131 MMHG | WEIGHT: 201.6 LBS | HEART RATE: 73 BPM

## 2024-07-29 DIAGNOSIS — Z95.0 PRESENCE OF PERMANENT CARDIAC PACEMAKER: ICD-10-CM

## 2024-07-29 DIAGNOSIS — I48.0 PAROXYSMAL A-FIB (HCC): ICD-10-CM

## 2024-07-29 DIAGNOSIS — I10 ESSENTIAL HYPERTENSION: Primary | ICD-10-CM

## 2024-07-29 PROCEDURE — G8427 DOCREV CUR MEDS BY ELIG CLIN: HCPCS | Performed by: INTERNAL MEDICINE

## 2024-07-29 PROCEDURE — 3078F DIAST BP <80 MM HG: CPT | Performed by: INTERNAL MEDICINE

## 2024-07-29 PROCEDURE — 1123F ACP DISCUSS/DSCN MKR DOCD: CPT | Performed by: INTERNAL MEDICINE

## 2024-07-29 PROCEDURE — 3075F SYST BP GE 130 - 139MM HG: CPT | Performed by: INTERNAL MEDICINE

## 2024-07-29 PROCEDURE — G8419 CALC BMI OUT NRM PARAM NOF/U: HCPCS | Performed by: INTERNAL MEDICINE

## 2024-07-29 PROCEDURE — 1036F TOBACCO NON-USER: CPT | Performed by: INTERNAL MEDICINE

## 2024-07-29 PROCEDURE — 99214 OFFICE O/P EST MOD 30 MIN: CPT | Performed by: INTERNAL MEDICINE

## 2024-07-29 RX ORDER — QUETIAPINE FUMARATE 25 MG/1
TABLET, FILM COATED ORAL
COMMUNITY
Start: 2024-03-07

## 2024-07-29 RX ORDER — QUETIAPINE FUMARATE 50 MG/1
25 TABLET, FILM COATED ORAL
COMMUNITY
Start: 2024-06-24

## 2024-07-29 ASSESSMENT — PATIENT HEALTH QUESTIONNAIRE - PHQ9
1. LITTLE INTEREST OR PLEASURE IN DOING THINGS: NOT AT ALL
SUM OF ALL RESPONSES TO PHQ QUESTIONS 1-9: 0
SUM OF ALL RESPONSES TO PHQ QUESTIONS 1-9: 0
SUM OF ALL RESPONSES TO PHQ9 QUESTIONS 1 & 2: 0
SUM OF ALL RESPONSES TO PHQ QUESTIONS 1-9: 0
2. FEELING DOWN, DEPRESSED OR HOPELESS: NOT AT ALL
SUM OF ALL RESPONSES TO PHQ QUESTIONS 1-9: 0

## 2024-07-29 NOTE — PROGRESS NOTES
Identified pt with two pt identifiers(name and ). Reviewed record in preparation for visit and have obtained necessary documentation.    Irving Suresh presents today for   Chief Complaint   Patient presents with    6 Month Follow-Up       Pt Denied DIZZINESS, SOB, CHEST PAIN/ PRESSURE, FATIGUE/WEAKNESS, HEADACHES, SWELLING.             Irving Suresh preferred language for health care discussion is english/other.    Personal Protective Equipment:   Personal Protective Equipment was used including: mask-surgical and hands-gloves. Patient was placed on no precaution(s). Patient was Not masked.    Precautions:   Patient currently on None  Patient currently roomed with door closed.    Is someone accompanying this pt? Yes    Is the patient using any DME equipment during OV? No    Depression Screenin/29/2024     9:11 AM 2023     8:32 AM 2022     8:52 AM 2022     8:35 AM 2021     9:22 AM   PHQ-9 Questionaire   Little interest or pleasure in doing things 0 0 0 0 0   Feeling down, depressed, or hopeless 0 0 0 0 0   PHQ-9 Total Score 0 0 0 0 0        Learning Assessment:  Who is the primary learner? Patient    What is the preferred language for health care of the primary learner? ENGLISH    How does the primary learner prefer to learn new concepts? DEMONSTRATION    Answered By self    Relationship to Learner SELF        Abuse Screenin/29/2024     9:00 AM   AMB Abuse Screening   Do you ever feel afraid of your partner? N   Are you in a relationship with someone who physically or mentally threatens you? N   Is it safe for you to go home? Y          Fall Risk      2024     9:12 AM 2023     8:32 AM   Fall Risk   Do you feel unsteady or are you worried about falling?  no no   2 or more falls in past year? no no   Fall with injury in past year? no no         Pt currently taking Anticoagulant /Antiplatelet therapy? Aspirin    Coordination of Care:  1. Have you been to

## 2024-07-31 NOTE — PROGRESS NOTES
Subjective:      Irving Suresh is in the office today for cardiac reevaluation.      He is a 79-year-old man with a history of hypertension, chronic atrial fibrillation, sick sinus syndrome with prior permanent pacemaker implantation and dyslipidemia.      In regard to his atrial fibrillation, he has been maintained on Eliquis for stroke prevention and a combination of a BB and a CCB for rate control.      In the office today, he reports that he feels \"great\".  He, once again, reports that he has been more tired.  He has had no chest pain.  He has some mild leg swelling.  He has not had any palpitations          Patient Active Problem List    Diagnosis Date Noted    Positive colorectal cancer screening using Cologuard test 07/17/2019    Gastroesophageal reflux disease 07/17/2019    Paroxysmal A-fib (HCC) 06/30/2017    Preoperative clearance 01/01/2017    Left-sided carotid artery disease (HCC) 01/01/2017    Sick sinus syndrome (HCC) 01/01/2017    Presence of permanent cardiac pacemaker 01/01/2017    History of venous disease 01/01/2017    Essential hypertension 01/01/2017    Dyslipidemia 01/01/2017    Tobacco abuse 01/01/2017    Family history of premature CAD 01/01/2017     Current Outpatient Medications   Medication Sig Dispense Refill    atorvastatin (LIPITOR) 80 mg tablet Take 1 Tablet by mouth nightly.      aspirin delayed-release 81 mg tablet Take 1 Tablet by mouth daily.      cyanocobalamin (VITAMIN B12) 100 mcg tablet Take 100 mcg by mouth daily.      galantamine (RAZADYNE) 8 mg SR capsule       gabapentin (NEURONTIN) 300 mg capsule Take 300 mg by mouth three (3) times daily.      traZODone (DESYREL) 50 mg tablet 50 mg nightly.      omeprazole (PRILOSEC) 40 mg capsule Take  by mouth daily.      cholecalciferol (VITAMIN D3) (1000 Units /25 mcg) tablet Take  by mouth daily.      apixaban (ELIQUIS) 5 mg tablet Take 1 Tab by mouth two (2) times a day. 180 Tab 3    hydrALAZINE (APRESOLINE) 25 mg tablet two (2)

## 2025-02-04 ENCOUNTER — NURSE ONLY (OUTPATIENT)
Age: 80
End: 2025-02-04

## 2025-02-04 DIAGNOSIS — Z95.0 PRESENCE OF PERMANENT CARDIAC PACEMAKER: Primary | ICD-10-CM

## 2025-02-04 DIAGNOSIS — I49.5 SICK SINUS SYNDROME (HCC): ICD-10-CM

## 2025-02-05 ENCOUNTER — OFFICE VISIT (OUTPATIENT)
Age: 80
End: 2025-02-05

## 2025-02-05 VITALS
HEART RATE: 63 BPM | WEIGHT: 201 LBS | OXYGEN SATURATION: 95 % | DIASTOLIC BLOOD PRESSURE: 73 MMHG | SYSTOLIC BLOOD PRESSURE: 145 MMHG | BODY MASS INDEX: 29.77 KG/M2 | HEIGHT: 69 IN

## 2025-02-05 DIAGNOSIS — I48.0 PAROXYSMAL A-FIB (HCC): ICD-10-CM

## 2025-02-05 DIAGNOSIS — I49.5 SICK SINUS SYNDROME (HCC): Primary | ICD-10-CM

## 2025-02-05 ASSESSMENT — PATIENT HEALTH QUESTIONNAIRE - PHQ9
2. FEELING DOWN, DEPRESSED OR HOPELESS: NOT AT ALL
1. LITTLE INTEREST OR PLEASURE IN DOING THINGS: NOT AT ALL
SUM OF ALL RESPONSES TO PHQ9 QUESTIONS 1 & 2: 0
SUM OF ALL RESPONSES TO PHQ QUESTIONS 1-9: 0

## 2025-02-05 NOTE — PROGRESS NOTES
Identified pt with two pt identifiers(name and ). Reviewed record in preparation for visit and have obtained necessary documentation.    Irving Knox Demetrice presents today for   Chief Complaint   Patient presents with    Follow-up      6 mo f/u (appt r/s per Dr. Campos)       Pt denied DIZZINESS, SOB, CHEST PAIN/ PRESSURE, FATIGUE/WEAKNESS, HEADACHES, SWELLING.             Irving Suresh preferred language for health care discussion is english/other.    Personal Protective Equipment:   Personal Protective Equipment was used including: mask-surgical and hands-gloves. Patient was placed on no precaution(s). Patient was not masked.    Precautions:   Patient currently on None  Patient currently roomed with door closed.    Is someone accompanying this pt? Yes(wife)    Is the patient using any DME equipment during OV? no    Depression Screenin/3/2025     9:02 AM 2024     9:11 AM 2023     8:32 AM 2022     8:52 AM 2022     8:35 AM 2021     9:22 AM   PHQ-9 Questionaire   Little interest or pleasure in doing things 0 0 0 0 0 0   Feeling down, depressed, or hopeless 0 0 0 0 0 0   PHQ-9 Total Score 0 0 0 0 0 0        Learning Assessment:  completed    Abuse Screenin/3/2025     9:00 AM 2024     9:00 AM   AMB Abuse Screening   Do you ever feel afraid of your partner? N N   Are you in a relationship with someone who physically or mentally threatens you? N N   Is it safe for you to go home? Y Y          Fall Risk      2/3/2025     9:02 AM 2024     9:12 AM 2023     8:32 AM   Fall Risk   Do you feel unsteady or are you worried about falling?  no no no   2 or more falls in past year? no no no   Fall with injury in past year? no no no         Pt currently taking Anticoagulant /Antiplatelet therapy? no    Coordination of Care:  1. Have you been to the ER, urgent care clinic since your last visit? Hospitalized since your last visit? no    2. Have you seen or

## 2025-08-06 ENCOUNTER — OFFICE VISIT (OUTPATIENT)
Age: 80
End: 2025-08-06
Payer: MEDICARE

## 2025-08-06 VITALS
HEART RATE: 56 BPM | HEIGHT: 69 IN | BODY MASS INDEX: 28.73 KG/M2 | SYSTOLIC BLOOD PRESSURE: 134 MMHG | DIASTOLIC BLOOD PRESSURE: 62 MMHG | WEIGHT: 194 LBS | OXYGEN SATURATION: 98 %

## 2025-08-06 DIAGNOSIS — I49.5 SICK SINUS SYNDROME (HCC): Primary | ICD-10-CM

## 2025-08-06 PROCEDURE — G8419 CALC BMI OUT NRM PARAM NOF/U: HCPCS | Performed by: INTERNAL MEDICINE

## 2025-08-06 PROCEDURE — 1036F TOBACCO NON-USER: CPT | Performed by: INTERNAL MEDICINE

## 2025-08-06 PROCEDURE — G8427 DOCREV CUR MEDS BY ELIG CLIN: HCPCS | Performed by: INTERNAL MEDICINE

## 2025-08-06 PROCEDURE — 3075F SYST BP GE 130 - 139MM HG: CPT | Performed by: INTERNAL MEDICINE

## 2025-08-06 PROCEDURE — 99214 OFFICE O/P EST MOD 30 MIN: CPT | Performed by: INTERNAL MEDICINE

## 2025-08-06 PROCEDURE — 1126F AMNT PAIN NOTED NONE PRSNT: CPT | Performed by: INTERNAL MEDICINE

## 2025-08-06 PROCEDURE — 1123F ACP DISCUSS/DSCN MKR DOCD: CPT | Performed by: INTERNAL MEDICINE

## 2025-08-06 PROCEDURE — 3078F DIAST BP <80 MM HG: CPT | Performed by: INTERNAL MEDICINE

## 2025-08-06 ASSESSMENT — PATIENT HEALTH QUESTIONNAIRE - PHQ9
1. LITTLE INTEREST OR PLEASURE IN DOING THINGS: NOT AT ALL
SUM OF ALL RESPONSES TO PHQ QUESTIONS 1-9: 0
2. FEELING DOWN, DEPRESSED OR HOPELESS: NOT AT ALL

## (undated) DEVICE — BITE BLK ENDOSCP AD 54FR GRN POLYETH ENDOSCP W STRP SLD

## (undated) DEVICE — Device: Brand: DEFENDO VALVE AND CONNECTOR KIT

## (undated) DEVICE — KIT COLON W/ 1.1OZ LUB AND 2 END

## (undated) DEVICE — CONTAINER PREFIL FRMLN 15ML --

## (undated) DEVICE — KENDALL 500 SERIES DIAPHORETIC FOAM MONITORING ELECTRODE - TEAR DROP SHAPE ( 30/PK): Brand: KENDALL

## (undated) DEVICE — BLOCK BITE BLOX W DENTAL RIM --

## (undated) DEVICE — BASIN EMESIS 500CC ROSE 250/CS 60/PLT: Brand: MEDEGEN MEDICAL PRODUCTS, LLC

## (undated) DEVICE — MEDI-VAC NON-CONDUCTIVE SUCTION TUBING: Brand: CARDINAL HEALTH

## (undated) DEVICE — TUBING, SUCTION, 3/16" X 6', STRAIGHT: Brand: MEDLINE

## (undated) DEVICE — FLEX ADVANTAGE 1500CC: Brand: FLEX ADVANTAGE

## (undated) DEVICE — FORCEPS BX L240CM JAW DIA2.8MM L CAP W/ NDL MIC MESH TOOTH

## (undated) DEVICE — SYR 50ML SLIP TIP NSAF LF STRL --

## (undated) DEVICE — SNARE ENDO 2.4MMX230CM -- COLD EXACTO

## (undated) DEVICE — DEVICE INFL 60ML 12ATM CONVENIENT LOK REL HNDL HI PRSS FLX